# Patient Record
Sex: FEMALE | Race: BLACK OR AFRICAN AMERICAN | NOT HISPANIC OR LATINO | Employment: OTHER | URBAN - METROPOLITAN AREA
[De-identification: names, ages, dates, MRNs, and addresses within clinical notes are randomized per-mention and may not be internally consistent; named-entity substitution may affect disease eponyms.]

---

## 2023-02-09 PROBLEM — G56.01 CARPAL TUNNEL SYNDROME OF RIGHT WRIST: Status: ACTIVE | Noted: 2023-02-09

## 2023-02-09 PROBLEM — K58.1 IRRITABLE BOWEL SYNDROME WITH CONSTIPATION: Status: ACTIVE | Noted: 2023-02-09

## 2023-02-09 PROBLEM — R40.0 DAYTIME SLEEPINESS: Status: ACTIVE | Noted: 2023-02-09

## 2023-02-09 PROBLEM — M79.672 PAIN IN BOTH FEET: Status: ACTIVE | Noted: 2023-02-09

## 2023-02-09 PROBLEM — S90.31XA CONTUSION OF RIGHT FOOT: Status: ACTIVE | Noted: 2023-02-09

## 2023-02-09 PROBLEM — R20.2 TINGLING IN EXTREMITIES: Status: ACTIVE | Noted: 2023-02-09

## 2023-02-09 PROBLEM — M21.42 FLAT FEET, BILATERAL: Status: ACTIVE | Noted: 2023-02-09

## 2023-02-09 PROBLEM — R20.0 NUMBNESS AND TINGLING OF RIGHT HAND: Status: ACTIVE | Noted: 2023-02-09

## 2023-02-09 PROBLEM — I10 BENIGN ESSENTIAL HYPERTENSION: Status: ACTIVE | Noted: 2023-02-09

## 2023-02-09 PROBLEM — S40.861A INSECT BITE OF RIGHT UPPER ARM: Status: ACTIVE | Noted: 2023-02-09

## 2023-02-09 PROBLEM — E04.9 THYROID ENLARGED: Status: ACTIVE | Noted: 2023-02-09
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
## 2023-02-09 PROBLEM — R91.8 LUNG NODULES: Status: ACTIVE | Noted: 2023-02-09

## 2023-02-09 PROBLEM — M21.41 FLAT FEET, BILATERAL: Status: ACTIVE | Noted: 2023-02-09

## 2023-02-09 PROBLEM — M54.12 CHRONIC CERVICAL RADICULOPATHY: Status: ACTIVE | Noted: 2023-02-09

## 2023-02-09 PROBLEM — R06.83 SNORING: Status: ACTIVE | Noted: 2023-02-09

## 2023-02-09 PROBLEM — M79.671 PAIN IN BOTH FEET: Status: ACTIVE | Noted: 2023-02-09

## 2023-02-09 PROBLEM — E78.2 MIXED HYPERLIPIDEMIA: Status: ACTIVE | Noted: 2023-02-09

## 2023-02-09 PROBLEM — K21.9 GASTROESOPHAGEAL REFLUX DISEASE WITHOUT ESOPHAGITIS: Status: ACTIVE | Noted: 2023-02-09

## 2023-02-09 PROBLEM — G47.30 SEVERE SLEEP APNEA: Status: ACTIVE | Noted: 2023-02-09

## 2023-02-09 PROBLEM — R20.2 NUMBNESS AND TINGLING OF RIGHT HAND: Status: ACTIVE | Noted: 2023-02-09

## 2023-02-09 PROBLEM — R20.0 NUMBNESS: Status: ACTIVE | Noted: 2023-02-09

## 2023-02-09 PROBLEM — R73.09 ELEVATED GLUCOSE: Status: ACTIVE | Noted: 2023-02-09

## 2023-02-09 PROBLEM — J30.9 ALLERGIC RHINITIS: Status: ACTIVE | Noted: 2023-02-09

## 2023-02-09 RX ORDER — FLUTICASONE FUROATE 27.5 UG/1
1 SPRAY, METERED NASAL DAILY
COMMUNITY
Start: 2022-02-07

## 2023-02-09 RX ORDER — LEVOCETIRIZINE DIHYDROCHLORIDE 5 MG/1
5 TABLET, FILM COATED ORAL DAILY
COMMUNITY
Start: 2021-01-13 | End: 2023-10-23 | Stop reason: SDUPTHER

## 2023-02-09 RX ORDER — TRIAMCINOLONE ACETONIDE 1 MG/G
OINTMENT TOPICAL 2 TIMES DAILY
COMMUNITY
Start: 2022-04-23

## 2023-02-09 RX ORDER — BACITRACIN 500 [USP'U]/G
OINTMENT TOPICAL 2 TIMES DAILY
COMMUNITY
Start: 2022-04-23 | End: 2023-10-30

## 2023-02-09 RX ORDER — AZELASTINE 1 MG/ML
2 SPRAY, METERED NASAL 2 TIMES DAILY
COMMUNITY

## 2023-02-09 RX ORDER — OMEPRAZOLE 20 MG/1
20 CAPSULE, DELAYED RELEASE ORAL DAILY
COMMUNITY
Start: 2021-11-29 | End: 2023-08-09 | Stop reason: SDUPTHER

## 2023-02-09 RX ORDER — ATORVASTATIN CALCIUM 10 MG/1
10 TABLET, FILM COATED ORAL DAILY
COMMUNITY
Start: 2021-04-08 | End: 2023-08-09 | Stop reason: SDUPTHER

## 2023-02-09 RX ORDER — AMLODIPINE BESYLATE 10 MG/1
10 TABLET ORAL DAILY
COMMUNITY
Start: 2021-04-10 | End: 2023-04-26 | Stop reason: SDUPTHER

## 2023-03-23 ENCOUNTER — OFFICE VISIT (OUTPATIENT)
Dept: PRIMARY CARE | Facility: CLINIC | Age: 78
End: 2023-03-23
Payer: MEDICARE

## 2023-03-23 VITALS
SYSTOLIC BLOOD PRESSURE: 122 MMHG | HEIGHT: 64 IN | BODY MASS INDEX: 27.9 KG/M2 | HEART RATE: 92 BPM | WEIGHT: 163.4 LBS | TEMPERATURE: 97.1 F | DIASTOLIC BLOOD PRESSURE: 77 MMHG | OXYGEN SATURATION: 98 %

## 2023-03-23 DIAGNOSIS — R73.09 ELEVATED GLUCOSE: Primary | ICD-10-CM

## 2023-03-23 PROCEDURE — 3074F SYST BP LT 130 MM HG: CPT | Performed by: FAMILY MEDICINE

## 2023-03-23 PROCEDURE — 99212 OFFICE O/P EST SF 10 MIN: CPT | Performed by: FAMILY MEDICINE

## 2023-03-23 PROCEDURE — 3078F DIAST BP <80 MM HG: CPT | Performed by: FAMILY MEDICINE

## 2023-03-23 ASSESSMENT — PATIENT HEALTH QUESTIONNAIRE - PHQ9
2. FEELING DOWN, DEPRESSED OR HOPELESS: NOT AT ALL
1. LITTLE INTEREST OR PLEASURE IN DOING THINGS: NOT AT ALL
SUM OF ALL RESPONSES TO PHQ9 QUESTIONS 1 AND 2: 0

## 2023-03-23 ASSESSMENT — PAIN SCALES - GENERAL: PAINLEVEL: 0-NO PAIN

## 2023-03-23 NOTE — PROGRESS NOTES
"Subjective   Patient ID: Kalyani Polk is a 77 y.o. female who presents for Diabetes (Pt. Presents for A1C ).  Not checking glucose.  Tries to watch carbohydrates and sugar.  Walks sometimes.  Should do more.  Last A1C done at a home check was 7.1.  No other concerns today.    Diabetes        Review of Systems    Objective   /77 (BP Location: Right arm, Patient Position: Sitting, BP Cuff Size: Adult)   Pulse 92   Temp 36.2 °C (97.1 °F) (Temporal)   Ht 1.613 m (5' 3.5\")   Wt 74.1 kg (163 lb 6.4 oz)   SpO2 98%   BMI 28.49 kg/m²     Physical Exam  Constitutional:       Appearance: Normal appearance.   Cardiovascular:      Rate and Rhythm: Normal rate and regular rhythm.      Heart sounds: No murmur heard.  Musculoskeletal:      Right lower leg: No edema.      Left lower leg: No edema.   Neurological:      Mental Status: She is alert.           Assessment/Plan   Problem List Items Addressed This Visit          Other    Elevated glucose - Primary     A1C 6.0 today is very good. Continue to watch diet, exercise. Can recheck in 6 months.      "

## 2023-04-26 DIAGNOSIS — I10 BENIGN ESSENTIAL HYPERTENSION: Primary | ICD-10-CM

## 2023-04-26 RX ORDER — AMLODIPINE BESYLATE 10 MG/1
10 TABLET ORAL DAILY
Qty: 90 TABLET | Refills: 1 | Status: SHIPPED | OUTPATIENT
Start: 2023-04-26 | End: 2023-10-05

## 2023-06-05 ENCOUNTER — OFFICE VISIT (OUTPATIENT)
Dept: PRIMARY CARE | Facility: CLINIC | Age: 78
End: 2023-06-05
Payer: MEDICARE

## 2023-06-05 VITALS
HEIGHT: 63 IN | SYSTOLIC BLOOD PRESSURE: 130 MMHG | WEIGHT: 162 LBS | HEART RATE: 85 BPM | DIASTOLIC BLOOD PRESSURE: 62 MMHG | OXYGEN SATURATION: 98 % | BODY MASS INDEX: 28.7 KG/M2 | RESPIRATION RATE: 16 BRPM

## 2023-06-05 DIAGNOSIS — R00.2 PALPITATIONS: Primary | ICD-10-CM

## 2023-06-05 DIAGNOSIS — C80.1 CANCER (MULTI): ICD-10-CM

## 2023-06-05 PROCEDURE — 3075F SYST BP GE 130 - 139MM HG: CPT | Performed by: FAMILY MEDICINE

## 2023-06-05 PROCEDURE — 1159F MED LIST DOCD IN RCRD: CPT | Performed by: FAMILY MEDICINE

## 2023-06-05 PROCEDURE — 93000 ELECTROCARDIOGRAM COMPLETE: CPT | Performed by: FAMILY MEDICINE

## 2023-06-05 PROCEDURE — 3078F DIAST BP <80 MM HG: CPT | Performed by: FAMILY MEDICINE

## 2023-06-05 PROCEDURE — 1036F TOBACCO NON-USER: CPT | Performed by: FAMILY MEDICINE

## 2023-06-05 PROCEDURE — 99213 OFFICE O/P EST LOW 20 MIN: CPT | Performed by: FAMILY MEDICINE

## 2023-06-05 PROCEDURE — 1160F RVW MEDS BY RX/DR IN RCRD: CPT | Performed by: FAMILY MEDICINE

## 2023-06-05 ASSESSMENT — PATIENT HEALTH QUESTIONNAIRE - PHQ9
SUM OF ALL RESPONSES TO PHQ9 QUESTIONS 1 AND 2: 0
1. LITTLE INTEREST OR PLEASURE IN DOING THINGS: NOT AT ALL
2. FEELING DOWN, DEPRESSED OR HOPELESS: NOT AT ALL

## 2023-06-05 ASSESSMENT — PAIN SCALES - GENERAL: PAINLEVEL: 0-NO PAIN

## 2023-06-05 NOTE — PROGRESS NOTES
"Subjective   Patient ID: Kalyani Polk is a 78 y.o. female who presents for Chest Pain (Flutter ,very stressed).  Had a few episodes of fluttery feeling in the left side of her chest, lasted  about 1 second and was gone.  Will go weeks between episodes. Hasn't had any for a while.  No chest pain, but IBS symptoms also flared up with a  lot of GI turmoil.  Daughter with cancer recurrence, has been very emotional and stressful.  She is getting a handle on it, and physical symptoms have abated some.    No SOB.  Gets a little swelling in feet that goes down with elevation.    Recently diagnosed with dry eye at [facility]. Was having left eye pain.  Has drops  to use.    Chest Pain         Review of Systems   Cardiovascular:  Positive for chest pain.       Objective   /62 (BP Location: Left arm, Patient Position: Sitting, BP Cuff Size: Adult)   Pulse 85   Resp 16   Ht 1.6 m (5' 3\")   Wt 73.5 kg (162 lb)   SpO2 98%   BMI 28.70 kg/m²     Physical Exam  Vitals reviewed.   Constitutional:       Appearance: Normal appearance.   Cardiovascular:      Rate and Rhythm: Normal rate and regular rhythm.      Heart sounds: No murmur heard.  Pulmonary:      Effort: Pulmonary effort is normal.      Breath sounds: Normal breath sounds.   Musculoskeletal:      Right lower leg: No edema.      Left lower leg: No edema.   Neurological:      Mental Status: She is alert.           Assessment/Plan   Problem List Items Addressed This Visit          Circulatory    Palpitations - Primary    Relevant Orders    ECG 12 lead (Clinic Performed)       Other    Cancer (CMS/HCC)     Previously treated colon cancer, in remission.             Palpitations, intermittent, without other worrisome symptoms.  EKG is normal.  Mostly likely related to stress.  For now, can monitor.  If increasing, or if getting dizzy, short of breath, chest pain, will evaluate further.  "

## 2023-06-05 NOTE — PATIENT INSTRUCTIONS
Palpitations, intermittent, without other worrisome symptoms.  EKG is normal.  Mostly likely related to stress.  For now, can monitor.  If increasing, or if getting dizzy, short of breath, chest pain, will evaluate further.

## 2023-08-09 DIAGNOSIS — E78.2 MIXED HYPERLIPIDEMIA: Primary | ICD-10-CM

## 2023-08-09 DIAGNOSIS — K21.9 GASTROESOPHAGEAL REFLUX DISEASE WITHOUT ESOPHAGITIS: ICD-10-CM

## 2023-08-09 RX ORDER — OMEPRAZOLE 20 MG/1
20 CAPSULE, DELAYED RELEASE ORAL
Qty: 90 CAPSULE | Refills: 1 | Status: SHIPPED | OUTPATIENT
Start: 2023-08-09 | End: 2024-01-24

## 2023-08-09 RX ORDER — ATORVASTATIN CALCIUM 10 MG/1
10 TABLET, FILM COATED ORAL DAILY
Qty: 90 TABLET | Refills: 1 | Status: SHIPPED | OUTPATIENT
Start: 2023-08-09 | End: 2024-01-24

## 2023-08-24 ENCOUNTER — OFFICE VISIT (OUTPATIENT)
Dept: PRIMARY CARE | Facility: CLINIC | Age: 78
End: 2023-08-24
Payer: MEDICARE

## 2023-08-24 VITALS
BODY MASS INDEX: 29.09 KG/M2 | OXYGEN SATURATION: 96 % | WEIGHT: 164.2 LBS | DIASTOLIC BLOOD PRESSURE: 76 MMHG | SYSTOLIC BLOOD PRESSURE: 112 MMHG | HEART RATE: 90 BPM

## 2023-08-24 DIAGNOSIS — G56.01 CARPAL TUNNEL SYNDROME OF RIGHT WRIST: Primary | ICD-10-CM

## 2023-08-24 PROCEDURE — 3078F DIAST BP <80 MM HG: CPT | Performed by: FAMILY MEDICINE

## 2023-08-24 PROCEDURE — 1036F TOBACCO NON-USER: CPT | Performed by: FAMILY MEDICINE

## 2023-08-24 PROCEDURE — 1159F MED LIST DOCD IN RCRD: CPT | Performed by: FAMILY MEDICINE

## 2023-08-24 PROCEDURE — 1160F RVW MEDS BY RX/DR IN RCRD: CPT | Performed by: FAMILY MEDICINE

## 2023-08-24 PROCEDURE — 99213 OFFICE O/P EST LOW 20 MIN: CPT | Performed by: FAMILY MEDICINE

## 2023-08-24 PROCEDURE — 1126F AMNT PAIN NOTED NONE PRSNT: CPT | Performed by: FAMILY MEDICINE

## 2023-08-24 PROCEDURE — 3074F SYST BP LT 130 MM HG: CPT | Performed by: FAMILY MEDICINE

## 2023-08-24 RX ORDER — METHYLPREDNISOLONE 4 MG/1
TABLET ORAL
Qty: 21 TABLET | Refills: 0 | Status: SHIPPED | OUTPATIENT
Start: 2023-08-24 | End: 2023-10-30

## 2023-08-24 ASSESSMENT — PAIN SCALES - GENERAL: PAINLEVEL: 0-NO PAIN

## 2023-08-24 NOTE — PATIENT INSTRUCTIONS
Right carpal tunnel syndrome, with increased symptoms.  Splinting is not helping.  Will try medrol dose pack to reduce inflammation, and see if that helps reduce the symptoms.  If not, or if it recurs, discussed doing an EMG/NCS, a nerve study to verify carpal tunnel syndrome.  Depending on the results of that test, it may be worth following up with the orthopedist for an injection vs surgery, to correct the problem.

## 2023-10-04 DIAGNOSIS — I10 BENIGN ESSENTIAL HYPERTENSION: ICD-10-CM

## 2023-10-05 RX ORDER — AMLODIPINE BESYLATE 10 MG/1
10 TABLET ORAL DAILY
Qty: 90 TABLET | Refills: 3 | Status: SHIPPED | OUTPATIENT
Start: 2023-10-05

## 2023-10-18 ENCOUNTER — OFFICE VISIT (OUTPATIENT)
Dept: PRIMARY CARE | Facility: CLINIC | Age: 78
End: 2023-10-18
Payer: MEDICARE

## 2023-10-18 VITALS
SYSTOLIC BLOOD PRESSURE: 112 MMHG | BODY MASS INDEX: 27.52 KG/M2 | WEIGHT: 161.2 LBS | HEART RATE: 78 BPM | TEMPERATURE: 97.5 F | OXYGEN SATURATION: 98 % | HEIGHT: 64 IN | DIASTOLIC BLOOD PRESSURE: 78 MMHG

## 2023-10-18 DIAGNOSIS — J01.00 ACUTE MAXILLARY SINUSITIS, RECURRENCE NOT SPECIFIED: Primary | ICD-10-CM

## 2023-10-18 PROCEDURE — 3078F DIAST BP <80 MM HG: CPT | Performed by: NURSE PRACTITIONER

## 2023-10-18 PROCEDURE — 1036F TOBACCO NON-USER: CPT | Performed by: NURSE PRACTITIONER

## 2023-10-18 PROCEDURE — 1160F RVW MEDS BY RX/DR IN RCRD: CPT | Performed by: NURSE PRACTITIONER

## 2023-10-18 PROCEDURE — 3074F SYST BP LT 130 MM HG: CPT | Performed by: NURSE PRACTITIONER

## 2023-10-18 PROCEDURE — 1159F MED LIST DOCD IN RCRD: CPT | Performed by: NURSE PRACTITIONER

## 2023-10-18 PROCEDURE — 1126F AMNT PAIN NOTED NONE PRSNT: CPT | Performed by: NURSE PRACTITIONER

## 2023-10-18 PROCEDURE — 99213 OFFICE O/P EST LOW 20 MIN: CPT | Performed by: NURSE PRACTITIONER

## 2023-10-18 RX ORDER — AZITHROMYCIN 250 MG/1
TABLET, FILM COATED ORAL
Qty: 6 TABLET | Refills: 0 | Status: SHIPPED | OUTPATIENT
Start: 2023-10-18 | End: 2023-10-30

## 2023-10-18 ASSESSMENT — PATIENT HEALTH QUESTIONNAIRE - PHQ9
1. LITTLE INTEREST OR PLEASURE IN DOING THINGS: NOT AT ALL
2. FEELING DOWN, DEPRESSED OR HOPELESS: NOT AT ALL
SUM OF ALL RESPONSES TO PHQ9 QUESTIONS 1 AND 2: 0

## 2023-10-18 ASSESSMENT — ENCOUNTER SYMPTOMS: COUGH: 1

## 2023-10-18 ASSESSMENT — PAIN SCALES - GENERAL: PAINLEVEL: 0-NO PAIN

## 2023-10-18 NOTE — PATIENT INSTRUCTIONS
Zpak as prescribed.  Increase fluids and rest.  Can continue to use Coricidin as needed for cold symptoms.  Continue allergy medications as previously taken.  Follow up if symptoms are not improving with treatment.

## 2023-10-18 NOTE — PROGRESS NOTES
"Subjective   Patient ID: Kalyani Polk is a 78 y.o. female who presents for Cough and Nasal Congestion.    Cold symptoms x one week or so.  Has been using OTCs - not helping.   Has ongoing allergies, not currently taking anything for allergies now.  Dripping nasal congestion  +cough overnight  Coricidin used, did help to clear out congestion.    Cough         Review of Systems   Respiratory:  Positive for cough.        Objective   /78 (BP Location: Right arm, Patient Position: Sitting, BP Cuff Size: Large adult)   Pulse 78   Temp 36.4 °C (97.5 °F) (Oral)   Ht 1.613 m (5' 3.5\")   Wt 73.1 kg (161 lb 3.2 oz)   SpO2 98%   BMI 28.11 kg/m²     Physical Exam  Vitals and nursing note reviewed.   Constitutional:       General: She is in acute distress (uncomfortable).      Appearance: Normal appearance. She is ill-appearing. She is not toxic-appearing.   HENT:      Head: Normocephalic.      Right Ear: Ear canal and external ear normal. A middle ear effusion is present.      Left Ear: Ear canal and external ear normal. A middle ear effusion is present.      Nose: Congestion and rhinorrhea present.      Mouth/Throat:      Mouth: Mucous membranes are moist.      Pharynx: Oropharynx is clear. Posterior oropharyngeal erythema present.   Eyes:      Conjunctiva/sclera: Conjunctivae normal.   Cardiovascular:      Rate and Rhythm: Normal rate and regular rhythm.      Heart sounds: Normal heart sounds.   Pulmonary:      Effort: Pulmonary effort is normal. No respiratory distress.      Breath sounds: Normal breath sounds.   Lymphadenopathy:      Cervical: No cervical adenopathy.         Assessment/Plan   Diagnoses and all orders for this visit:  Acute maxillary sinusitis, recurrence not specified  -     azithromycin (Zithromax) 250 mg tablet; Take 2 tablets (500 mg) by mouth once daily for 1 day, THEN 1 tablet (250 mg) once daily for 4 days.         "

## 2023-10-23 DIAGNOSIS — J30.89 ALLERGIC RHINITIS DUE TO OTHER ALLERGIC TRIGGER, UNSPECIFIED SEASONALITY: Primary | ICD-10-CM

## 2023-10-23 RX ORDER — LEVOCETIRIZINE DIHYDROCHLORIDE 5 MG/1
5 TABLET, FILM COATED ORAL EVERY EVENING
Qty: 90 TABLET | Refills: 0 | Status: SHIPPED | OUTPATIENT
Start: 2023-10-23 | End: 2024-01-17

## 2023-10-30 ENCOUNTER — OFFICE VISIT (OUTPATIENT)
Dept: PRIMARY CARE | Facility: CLINIC | Age: 78
End: 2023-10-30
Payer: MEDICARE

## 2023-10-30 VITALS
OXYGEN SATURATION: 100 % | DIASTOLIC BLOOD PRESSURE: 64 MMHG | BODY MASS INDEX: 28.48 KG/M2 | TEMPERATURE: 97.5 F | SYSTOLIC BLOOD PRESSURE: 112 MMHG | HEIGHT: 64 IN | WEIGHT: 166.8 LBS | HEART RATE: 97 BPM

## 2023-10-30 DIAGNOSIS — G56.01 CARPAL TUNNEL SYNDROME OF RIGHT WRIST: ICD-10-CM

## 2023-10-30 DIAGNOSIS — R20.2 TINGLING IN EXTREMITIES: ICD-10-CM

## 2023-10-30 DIAGNOSIS — M67.431 GANGLION CYST OF WRIST, RIGHT: Primary | ICD-10-CM

## 2023-10-30 PROCEDURE — 3078F DIAST BP <80 MM HG: CPT | Performed by: NURSE PRACTITIONER

## 2023-10-30 PROCEDURE — 1036F TOBACCO NON-USER: CPT | Performed by: NURSE PRACTITIONER

## 2023-10-30 PROCEDURE — 99213 OFFICE O/P EST LOW 20 MIN: CPT | Performed by: NURSE PRACTITIONER

## 2023-10-30 PROCEDURE — 1160F RVW MEDS BY RX/DR IN RCRD: CPT | Performed by: NURSE PRACTITIONER

## 2023-10-30 PROCEDURE — 1125F AMNT PAIN NOTED PAIN PRSNT: CPT | Performed by: NURSE PRACTITIONER

## 2023-10-30 PROCEDURE — 3074F SYST BP LT 130 MM HG: CPT | Performed by: NURSE PRACTITIONER

## 2023-10-30 PROCEDURE — 1159F MED LIST DOCD IN RCRD: CPT | Performed by: NURSE PRACTITIONER

## 2023-10-30 RX ORDER — METHYLPREDNISOLONE 4 MG/1
TABLET ORAL
Qty: 21 TABLET | Refills: 0 | Status: SHIPPED | OUTPATIENT
Start: 2023-10-30 | End: 2023-12-21 | Stop reason: WASHOUT

## 2023-10-30 ASSESSMENT — PATIENT HEALTH QUESTIONNAIRE - PHQ9
1. LITTLE INTEREST OR PLEASURE IN DOING THINGS: NOT AT ALL
SUM OF ALL RESPONSES TO PHQ9 QUESTIONS 1 AND 2: 0
2. FEELING DOWN, DEPRESSED OR HOPELESS: NOT AT ALL

## 2023-10-30 ASSESSMENT — PAIN SCALES - GENERAL: PAINLEVEL: 8

## 2023-10-30 NOTE — PROGRESS NOTES
"Subjective   Patient ID: Kalyani Polk is a 78 y.o. female who presents for Arm Pain and Peripheral Nerve Injury.    Presents for follow up for right arm nerve pain - tingling.  Constant, mainly along right wrist with radiating pain into fingers 2-5.  Last night was in right upper arm. Denies any neck pain or recent injuries.  Has tried wearing brace for wrist, makes pain worse.  Has seen Orthopedics in the past. Does not want to have surgery.  Last visit with Dr. Hernandez, told if persisting, would need EMG.   Reports history of ganglion cyst in same area many years ago. Was drained but never surgically removed.    Arm Pain          Review of Systems    Objective   /64 (BP Location: Left arm, Patient Position: Sitting, BP Cuff Size: Small adult)   Pulse 97   Temp 36.4 °C (97.5 °F) (Oral)   Ht 1.613 m (5' 3.5\")   Wt 75.7 kg (166 lb 12.8 oz)   SpO2 100%   BMI 29.08 kg/m²     Physical Exam  Vitals and nursing note reviewed.   Constitutional:       General: She is not in acute distress.     Appearance: Normal appearance.   HENT:      Right Ear: Ear canal and external ear normal. A middle ear effusion is present.      Left Ear: Ear canal and external ear normal. A middle ear effusion is present.      Nose: Nose normal.      Mouth/Throat:      Mouth: Mucous membranes are moist.      Pharynx: Oropharynx is clear.   Cardiovascular:      Pulses:           Radial pulses are 2+ on the right side and 2+ on the left side.   Musculoskeletal:      Right wrist: Swelling and tenderness (radial ganglion cyst) present. Decreased range of motion. Normal pulse.      Right hand: Decreased strength.      Left hand: Normal.      Comments: Negative Tinel's  Unable to perform Phalen's due to arthritis changes in wrist   Lymphadenopathy:      Cervical: No cervical adenopathy.   Skin:     Capillary Refill: Capillary refill takes less than 2 seconds.         Assessment/Plan   Diagnoses and all orders for this visit:  Ganglion cyst of " wrist, right  -     Referral to Orthopaedic Surgery; Future  Tingling in extremities  Carpal tunnel syndrome of right wrist  -     methylPREDNISolone (Medrol Dospak) 4 mg tablets; Take as directed on package.

## 2023-10-30 NOTE — PATIENT INSTRUCTIONS
Medrol (steroid) as prescribed for right wrist inflammation and sinus swelling/allergies.  Refer back to Orthopedic hand for follow up options for ganglion cyst, wrist arthritis.  Call to schedule

## 2023-11-01 ENCOUNTER — TELEPHONE (OUTPATIENT)
Dept: PRIMARY CARE | Facility: CLINIC | Age: 78
End: 2023-11-01
Payer: MEDICARE

## 2023-11-01 DIAGNOSIS — G56.01 CARPAL TUNNEL SYNDROME OF RIGHT WRIST: ICD-10-CM

## 2023-11-01 DIAGNOSIS — M67.431 GANGLION CYST OF WRIST, RIGHT: Primary | ICD-10-CM

## 2023-11-01 DIAGNOSIS — R20.2 TINGLING IN EXTREMITIES: ICD-10-CM

## 2023-11-01 RX ORDER — GABAPENTIN 100 MG/1
100 CAPSULE ORAL 2 TIMES DAILY
Qty: 60 CAPSULE | Refills: 0 | Status: SHIPPED | OUTPATIENT
Start: 2023-11-01 | End: 2023-12-01

## 2023-11-13 ENCOUNTER — OFFICE VISIT (OUTPATIENT)
Dept: PRIMARY CARE | Facility: CLINIC | Age: 78
End: 2023-11-13
Payer: MEDICARE

## 2023-11-13 VITALS
DIASTOLIC BLOOD PRESSURE: 76 MMHG | BODY MASS INDEX: 28.54 KG/M2 | SYSTOLIC BLOOD PRESSURE: 144 MMHG | WEIGHT: 167.2 LBS | OXYGEN SATURATION: 96 % | TEMPERATURE: 97.1 F | HEART RATE: 112 BPM | HEIGHT: 64 IN

## 2023-11-13 DIAGNOSIS — Z23 ENCOUNTER FOR IMMUNIZATION: Primary | ICD-10-CM

## 2023-11-13 DIAGNOSIS — R20.2 NUMBNESS AND TINGLING OF RIGHT HAND: ICD-10-CM

## 2023-11-13 DIAGNOSIS — R20.0 NUMBNESS AND TINGLING OF RIGHT HAND: ICD-10-CM

## 2023-11-13 PROCEDURE — G0008 ADMIN INFLUENZA VIRUS VAC: HCPCS | Performed by: FAMILY MEDICINE

## 2023-11-13 PROCEDURE — 1126F AMNT PAIN NOTED NONE PRSNT: CPT | Performed by: FAMILY MEDICINE

## 2023-11-13 PROCEDURE — 99212 OFFICE O/P EST SF 10 MIN: CPT | Performed by: FAMILY MEDICINE

## 2023-11-13 PROCEDURE — 90662 IIV NO PRSV INCREASED AG IM: CPT | Performed by: FAMILY MEDICINE

## 2023-11-13 PROCEDURE — 1036F TOBACCO NON-USER: CPT | Performed by: FAMILY MEDICINE

## 2023-11-13 PROCEDURE — 1160F RVW MEDS BY RX/DR IN RCRD: CPT | Performed by: FAMILY MEDICINE

## 2023-11-13 PROCEDURE — 3078F DIAST BP <80 MM HG: CPT | Performed by: FAMILY MEDICINE

## 2023-11-13 PROCEDURE — 1159F MED LIST DOCD IN RCRD: CPT | Performed by: FAMILY MEDICINE

## 2023-11-13 PROCEDURE — 3077F SYST BP >= 140 MM HG: CPT | Performed by: FAMILY MEDICINE

## 2023-11-13 ASSESSMENT — PAIN SCALES - GENERAL: PAINLEVEL: 0-NO PAIN

## 2023-11-13 NOTE — PROGRESS NOTES
"Subjective   Patient ID: Kalyani Polk is a 78 y.o. female who presents for Hand Pain (Right hand pain /Tingling, burning and numbness ) and Ganglion Cyst.  Complains of ongoing/worsening right hand and wrist pain with tingling.  Will see Dr. Marcos Diaz tomorrow.  Increased tingling that never stops.  Now is into the thumb and palm.  Interferes with sleep.  Had medrol dose pack, and more recently, given gabapentin.  Gabapentin made dizzy.  Only  took  3 pills.  Helped pain, not tingling.  Maybe feels week as well.  Wrist splint made feel worse, maybe too small.            Review of Systems    Objective   /76 (BP Location: Left arm, Patient Position: Sitting, BP Cuff Size: Adult)   Pulse (!) 112   Temp 36.2 °C (97.1 °F) (Temporal)   Ht 1.613 m (5' 3.5\")   Wt 75.8 kg (167 lb 3.2 oz)   SpO2 96%   BMI 29.15 kg/m²     Physical Exam  Vitals reviewed.   Constitutional:       Appearance: Normal appearance.   Musculoskeletal:      Comments: Right wrist with decreased   strength.  Hyperesthesia of palm and thumb.   Neurological:      Mental Status: She is alert.           Assessment/Plan   Problem List Items Addressed This Visit       Numbness and tingling of right hand     Other Visit Diagnoses       Encounter for immunization    -  Primary    Relevant Orders    Flu vaccine, quadrivalent, high-dose, preservative free, age 65y+ (FLUZONE) (Completed)               "

## 2023-11-14 ENCOUNTER — OFFICE VISIT (OUTPATIENT)
Dept: ORTHOPEDIC SURGERY | Facility: CLINIC | Age: 78
End: 2023-11-14
Payer: MEDICARE

## 2023-11-14 VITALS — HEIGHT: 63 IN | BODY MASS INDEX: 29.59 KG/M2 | WEIGHT: 167 LBS

## 2023-11-14 DIAGNOSIS — G56.01 CARPAL TUNNEL SYNDROME ON RIGHT: Primary | ICD-10-CM

## 2023-11-14 DIAGNOSIS — M67.431 GANGLION CYST OF WRIST, RIGHT: ICD-10-CM

## 2023-11-14 PROCEDURE — 1036F TOBACCO NON-USER: CPT | Performed by: ORTHOPAEDIC SURGERY

## 2023-11-14 PROCEDURE — 1160F RVW MEDS BY RX/DR IN RCRD: CPT | Performed by: ORTHOPAEDIC SURGERY

## 2023-11-14 PROCEDURE — 99214 OFFICE O/P EST MOD 30 MIN: CPT | Performed by: ORTHOPAEDIC SURGERY

## 2023-11-14 PROCEDURE — 1159F MED LIST DOCD IN RCRD: CPT | Performed by: ORTHOPAEDIC SURGERY

## 2023-11-14 PROCEDURE — 20526 THER INJECTION CARP TUNNEL: CPT | Performed by: ORTHOPAEDIC SURGERY

## 2023-11-14 PROCEDURE — 3078F DIAST BP <80 MM HG: CPT | Performed by: ORTHOPAEDIC SURGERY

## 2023-11-14 PROCEDURE — 3077F SYST BP >= 140 MM HG: CPT | Performed by: ORTHOPAEDIC SURGERY

## 2023-11-14 PROCEDURE — 1126F AMNT PAIN NOTED NONE PRSNT: CPT | Performed by: ORTHOPAEDIC SURGERY

## 2023-11-14 RX ORDER — TRIAMCINOLONE ACETONIDE 40 MG/ML
20 INJECTION, SUSPENSION INTRA-ARTICULAR; INTRAMUSCULAR
Status: COMPLETED | OUTPATIENT
Start: 2023-11-14 | End: 2023-11-14

## 2023-11-14 RX ADMIN — TRIAMCINOLONE ACETONIDE 20 MG: 40 INJECTION, SUSPENSION INTRA-ARTICULAR; INTRAMUSCULAR at 10:49

## 2023-11-14 NOTE — LETTER
November 14, 2023     Marilyn Hernadnez MD  [address]  [address]  [facility] [address]    Patient: Kalyani Polk   YOB: 1945   Date of Visit: 11/14/2023       Dear Dr. Marilyn Hernandez MD:    Thank you for referring Kalyani Polk to me for evaluation. Below are my notes for this consultation.  If you have questions, please do not hesitate to call me. I look forward to following your patient along with you.       Sincerely,     Marcos Diaz MD      CC: No Recipients  ______________________________________________________________________________________    78-year-old retired right-hand-dominant female with prediabetes presents today for evaluation of right hand pain numbness and tingling.  She has frequent sleep disturbances.  Symptoms have been present for many many months.  She was diagnosed with carpal tunnel syndrome by Dr. Gonzlaez in February.  At that time recommendations were for conservative management with nighttime splinting.  She indicates that she has tried splinting but did not feel that her splint was comfortable and so she does not wear it.  More recently her primary care physician has given her prednisone and gabapentin.  She does not tolerate the side effects from the gabapentin and does not feel that this is helped her symptoms either.    Past medical history, medications, allergies, surgical history and review of systems have been reviewed with the patient. Pertinent changes are documented in the HPI. Otherwise they are unchanged when compared to last visit on February 14, 2023 with Dr. Gonzalez.    Physical Examination Findings:  Constitutional: Appears well-developed and well-nourished.  Head: Normocephalic and atraumatic.  Eyes: Pupils are equal and round.  Cardiovascular: Intact distal pulses.   Respiratory: Effort normal. No respiratory distress.  Neurologic: Alert and oriented to person, place, and time.  Skin: Skin is warm and dry.  Hematologic / Lymphatic: No lymphedema,  lymphangitis.  Psychiatric: normal mood and affect. Behavior is normal.   Musculoskeletal: Right wrist examination reveals minimal degenerative changes.  She has what appears to be a small asymptomatic ganglion cyst on the volar aspect of the distal radius.  No thenar atrophy.  Full finger and thumb flexion.  No triggering.  Positive Rogerio median nerve compression test.  Positive Tinel's sign.  Diminished sensation median nerve distribution.    Impression: Right carpal tunnel syndrome.    Plan: We have discussed this diagnosis.  We have discussed the benefit of splint utilization at nighttime to help her sleep by avoiding prolonged wrist flexion.  We have discussed the benefit of steroid injections for diagnostic and therapeutic purposes.  We have discussed the role of surgery for this condition.  At this time she wants to try injections.  I do not think the gabapentin is an appropriate medication for this condition and she can discontinue that particular since she does not tolerate the side effects.    Hand / UE Inj/Asp: R carpal tunnel for carpal tunnel syndrome on 11/14/2023 10:49 AM  Indications: pain and therapeutic  Details: 25 G needle, volar approach  Medications: 20 mg triamcinolone acetonide 40 mg/mL  Outcome: tolerated well, no immediate complications  Procedure, treatment alternatives, risks and benefits explained, specific risks discussed. Consent was given by the patient. Immediately prior to procedure a time out was called to verify the correct patient, procedure, equipment, support staff and site/side marked as required. Patient was prepped and draped in the usual sterile fashion.             Return as needed for recurrence or progression of problems .    Marcos Diaz MD    [facility] [employer]  Department of Orthopaedic Surgery  Chief of Hand and Upper Extremity Surgery  [facility]    Dictation performed with  the use of voice recognition software. Syntax and grammatical errors may exist.

## 2023-11-14 NOTE — PATIENT INSTRUCTIONS
Worsening right  hand  and wrist tingling and  pain.  Will be seeing Dr. Diaz tomorrow.  In meantime, take 1 gabapentin tonight  to help with symptoms.    Thyroid with nodules.  Repeat ultrasound  is due around Jan 20, 2024.

## 2023-11-14 NOTE — PROGRESS NOTES
78-year-old retired right-hand-dominant female with prediabetes presents today for evaluation of right hand pain numbness and tingling.  She has frequent sleep disturbances.  Symptoms have been present for many many months.  She was diagnosed with carpal tunnel syndrome by Dr. Gonzalez in February.  At that time recommendations were for conservative management with nighttime splinting.  She indicates that she has tried splinting but did not feel that her splint was comfortable and so she does not wear it.  More recently her primary care physician has given her prednisone and gabapentin.  She does not tolerate the side effects from the gabapentin and does not feel that this is helped her symptoms either.    Past medical history, medications, allergies, surgical history and review of systems have been reviewed with the patient. Pertinent changes are documented in the HPI. Otherwise they are unchanged when compared to last visit on February 14, 2023 with Dr. Gonzalez.    Physical Examination Findings:  Constitutional: Appears well-developed and well-nourished.  Head: Normocephalic and atraumatic.  Eyes: Pupils are equal and round.  Cardiovascular: Intact distal pulses.   Respiratory: Effort normal. No respiratory distress.  Neurologic: Alert and oriented to person, place, and time.  Skin: Skin is warm and dry.  Hematologic / Lymphatic: No lymphedema, lymphangitis.  Psychiatric: normal mood and affect. Behavior is normal.   Musculoskeletal: Right wrist examination reveals minimal degenerative changes.  She has what appears to be a small asymptomatic ganglion cyst on the volar aspect of the distal radius.  No thenar atrophy.  Full finger and thumb flexion.  No triggering.  Positive Rogerio median nerve compression test.  Positive Tinel's sign.  Diminished sensation median nerve distribution.    Impression: Right carpal tunnel syndrome.    Plan: We have discussed this diagnosis.  We have discussed the benefit of splint  utilization at nighttime to help her sleep by avoiding prolonged wrist flexion.  We have discussed the benefit of steroid injections for diagnostic and therapeutic purposes.  We have discussed the role of surgery for this condition.  At this time she wants to try injections.  I do not think the gabapentin is an appropriate medication for this condition and she can discontinue that particular since she does not tolerate the side effects.    Hand / UE Inj/Asp: R carpal tunnel for carpal tunnel syndrome on 11/14/2023 10:49 AM  Indications: pain and therapeutic  Details: 25 G needle, volar approach  Medications: 20 mg triamcinolone acetonide 40 mg/mL  Outcome: tolerated well, no immediate complications  Procedure, treatment alternatives, risks and benefits explained, specific risks discussed. Consent was given by the patient. Immediately prior to procedure a time out was called to verify the correct patient, procedure, equipment, support staff and site/side marked as required. Patient was prepped and draped in the usual sterile fashion.             Return as needed for recurrence or progression of problems .    Marcos Diaz MD    [facility] [employer]  Department of Orthopaedic Surgery  Chief of Hand and Upper Extremity Surgery  [facility]    Dictation performed with the use of voice recognition software. Syntax and grammatical errors may exist.

## 2023-12-02 ENCOUNTER — APPOINTMENT (OUTPATIENT)
Dept: RADIOLOGY | Facility: HOSPITAL | Age: 78
End: 2023-12-02
Payer: MEDICARE

## 2023-12-02 ENCOUNTER — HOSPITAL ENCOUNTER (EMERGENCY)
Facility: HOSPITAL | Age: 78
Discharge: HOME | End: 2023-12-02
Attending: INTERNAL MEDICINE
Payer: MEDICARE

## 2023-12-02 VITALS
DIASTOLIC BLOOD PRESSURE: 87 MMHG | BODY MASS INDEX: 27.66 KG/M2 | RESPIRATION RATE: 18 BRPM | OXYGEN SATURATION: 98 % | WEIGHT: 162 LBS | HEIGHT: 64 IN | TEMPERATURE: 98.1 F | SYSTOLIC BLOOD PRESSURE: 138 MMHG | HEART RATE: 92 BPM

## 2023-12-02 DIAGNOSIS — R51.9 NONINTRACTABLE HEADACHE, UNSPECIFIED CHRONICITY PATTERN, UNSPECIFIED HEADACHE TYPE: Primary | ICD-10-CM

## 2023-12-02 LAB
ALBUMIN SERPL BCP-MCNC: 4 G/DL (ref 3.4–5)
ALP SERPL-CCNC: 43 U/L (ref 33–136)
ALT SERPL W P-5'-P-CCNC: 13 U/L (ref 7–45)
ANION GAP SERPL CALC-SCNC: 16 MMOL/L (ref 10–20)
APPEARANCE UR: CLEAR
AST SERPL W P-5'-P-CCNC: 16 U/L (ref 9–39)
BASOPHILS # BLD AUTO: 0.04 X10*3/UL (ref 0–0.1)
BASOPHILS NFR BLD AUTO: 0.7 %
BILIRUB SERPL-MCNC: 0.6 MG/DL (ref 0–1.2)
BILIRUB UR STRIP.AUTO-MCNC: NEGATIVE MG/DL
BUN SERPL-MCNC: 12 MG/DL (ref 6–23)
CALCIUM SERPL-MCNC: 8.3 MG/DL (ref 8.6–10.3)
CHLORIDE SERPL-SCNC: 103 MMOL/L (ref 98–107)
CO2 SERPL-SCNC: 21 MMOL/L (ref 21–32)
COLOR UR: YELLOW
CREAT SERPL-MCNC: 0.9 MG/DL (ref 0.5–1.05)
EOSINOPHIL # BLD AUTO: 0.05 X10*3/UL (ref 0–0.4)
EOSINOPHIL NFR BLD AUTO: 0.9 %
ERYTHROCYTE [DISTWIDTH] IN BLOOD BY AUTOMATED COUNT: 14.6 % (ref 11.5–14.5)
ERYTHROCYTE [SEDIMENTATION RATE] IN BLOOD BY WESTERGREN METHOD: 58 MM/H (ref 0–30)
FLUAV RNA RESP QL NAA+PROBE: NOT DETECTED
FLUBV RNA RESP QL NAA+PROBE: NOT DETECTED
GFR SERPL CREATININE-BSD FRML MDRD: 65 ML/MIN/1.73M*2
GLUCOSE SERPL-MCNC: 106 MG/DL (ref 74–99)
GLUCOSE UR STRIP.AUTO-MCNC: NEGATIVE MG/DL
HCT VFR BLD AUTO: 40 % (ref 36–46)
HGB BLD-MCNC: 13.7 G/DL (ref 12–16)
HOLD SPECIMEN: NORMAL
IMM GRANULOCYTES # BLD AUTO: 0.04 X10*3/UL (ref 0–0.5)
IMM GRANULOCYTES NFR BLD AUTO: 0.7 % (ref 0–0.9)
KETONES UR STRIP.AUTO-MCNC: NEGATIVE MG/DL
LEUKOCYTE ESTERASE UR QL STRIP.AUTO: NEGATIVE
LYMPHOCYTES # BLD AUTO: 1.12 X10*3/UL (ref 0.8–3)
LYMPHOCYTES NFR BLD AUTO: 20.9 %
MCH RBC QN AUTO: 27.8 PG (ref 26–34)
MCHC RBC AUTO-ENTMCNC: 34.3 G/DL (ref 32–36)
MCV RBC AUTO: 81 FL (ref 80–100)
MONOCYTES # BLD AUTO: 0.8 X10*3/UL (ref 0.05–0.8)
MONOCYTES NFR BLD AUTO: 14.9 %
NEUTROPHILS # BLD AUTO: 3.32 X10*3/UL (ref 1.6–5.5)
NEUTROPHILS NFR BLD AUTO: 61.9 %
NITRITE UR QL STRIP.AUTO: NEGATIVE
NRBC BLD-RTO: 0 /100 WBCS (ref 0–0)
PH UR STRIP.AUTO: 7 [PH]
PLATELET # BLD AUTO: 254 X10*3/UL (ref 150–450)
POTASSIUM SERPL-SCNC: 3.5 MMOL/L (ref 3.5–5.3)
PROT SERPL-MCNC: 6.9 G/DL (ref 6.4–8.2)
PROT UR STRIP.AUTO-MCNC: NEGATIVE MG/DL
RBC # BLD AUTO: 4.92 X10*6/UL (ref 4–5.2)
RBC # UR STRIP.AUTO: NEGATIVE /UL
SARS-COV-2 RNA RESP QL NAA+PROBE: NOT DETECTED
SODIUM SERPL-SCNC: 136 MMOL/L (ref 136–145)
SP GR UR STRIP.AUTO: 1.01
UROBILINOGEN UR STRIP.AUTO-MCNC: NORMAL MG/DL
WBC # BLD AUTO: 5.4 X10*3/UL (ref 4.4–11.3)

## 2023-12-02 PROCEDURE — 70450 CT HEAD/BRAIN W/O DYE: CPT | Performed by: RADIOLOGY

## 2023-12-02 PROCEDURE — 36415 COLL VENOUS BLD VENIPUNCTURE: CPT

## 2023-12-02 PROCEDURE — 85652 RBC SED RATE AUTOMATED: CPT

## 2023-12-02 PROCEDURE — 87636 SARSCOV2 & INF A&B AMP PRB: CPT

## 2023-12-02 PROCEDURE — 99284 EMERGENCY DEPT VISIT MOD MDM: CPT | Mod: 25 | Performed by: INTERNAL MEDICINE

## 2023-12-02 PROCEDURE — 81003 URINALYSIS AUTO W/O SCOPE: CPT

## 2023-12-02 PROCEDURE — 85025 COMPLETE CBC W/AUTO DIFF WBC: CPT

## 2023-12-02 PROCEDURE — 70450 CT HEAD/BRAIN W/O DYE: CPT

## 2023-12-02 PROCEDURE — 84075 ASSAY ALKALINE PHOSPHATASE: CPT

## 2023-12-02 ASSESSMENT — PAIN SCALES - GENERAL
PAINLEVEL_OUTOF10: 0 - NO PAIN

## 2023-12-02 ASSESSMENT — COLUMBIA-SUICIDE SEVERITY RATING SCALE - C-SSRS
6. HAVE YOU EVER DONE ANYTHING, STARTED TO DO ANYTHING, OR PREPARED TO DO ANYTHING TO END YOUR LIFE?: NO
1. IN THE PAST MONTH, HAVE YOU WISHED YOU WERE DEAD OR WISHED YOU COULD GO TO SLEEP AND NOT WAKE UP?: NO
2. HAVE YOU ACTUALLY HAD ANY THOUGHTS OF KILLING YOURSELF?: NO

## 2023-12-02 ASSESSMENT — PAIN - FUNCTIONAL ASSESSMENT: PAIN_FUNCTIONAL_ASSESSMENT: 0-10

## 2023-12-02 ASSESSMENT — VISUAL ACUITY
OD: 20/50
OS: 20/50
OU: 20/40

## 2023-12-02 NOTE — ED PROVIDER NOTES
HPI   Chief Complaint   Patient presents with    Headache       HPI  HISTORY OF PRESENT ILLNESS:  78 y.o. female presenting to the ED with complaint of a headache that began last night.  Patient reports an intermittent left-sided headache that woke her up several times during the night last night.  She describes it as a jabbing feeling on the left side of the head.  She states that the pain has resolved prior to arrival in the ED.  She states that she has never had a similar headache in the past and has no history of chronic headaches or migraines, which made her nervous, so she decided to come to the ED.  She does endorse some nasal congestion and rhinorrhea over the past 3 to 4 days, as well as some right ear fullness, but denies any other symptoms or complaints.  She has no blurry vision, no vision loss or changes.  No pain with eye movements.  Denies facial pain or pressure.  Is not the worst headache of her life, was not thunderclap in onset.  Has a history of right hand carpal tunnel and has had some tingling in the right digits for multiple months, this is unchanged and at baseline currently.  She denies any other extremity numbness, tingling, or weakness.  Denies gait instability.  Denies difficulty with speech, swallowing, or ambulation.  Denies neck pain or stiffness.  Denies confusion or amnesia.  She has no cough, no shortness of breath, no chest pain, no pleuritic pain.  No palpitations.  No lower extremity pain or swelling.  No abdominal pain, no nausea or vomiting.  Denies fevers or chills.  No other complaints or symptoms voiced.    PMH: HTN, HLD, allergic rhinitis, carpal tunnel syndrome right wrist, GERD, IBS, LICHA  Family history: noncontributory  Social history: non smoker, no ETOH, no illicit substances    12 point review of systems was performed and is negative unless otherwise specified in HPI.        No data recorded                Patient History   Past Medical History:   Diagnosis Date     Personal history of other malignant neoplasm of large intestine 01/17/2021    History of malignant neoplasm of colon     Past Surgical History:   Procedure Laterality Date    OTHER SURGICAL HISTORY  01/17/2021    Colon surgery    OTHER SURGICAL HISTORY  01/17/2021    Tubal ligation     Family History   Problem Relation Name Age of Onset    Dementia Mother      Osteoporosis Mother      Other (neoplasm of colon) Father      Other (CVA) Sister       Social History     Tobacco Use    Smoking status: Never     Passive exposure: Never    Smokeless tobacco: Never   Substance Use Topics    Alcohol use: Never    Drug use: Never       Physical Exam   ED Triage Vitals [12/02/23 0858]   Temp Heart Rate Resp BP   36.5 °C (97.7 °F) 108 16 152/83      SpO2 Temp Source Heart Rate Source Patient Position   97 % Tympanic -- --      BP Location FiO2 (%)     -- --       Physical Exam  Constitutional:       General: She is not in acute distress.     Appearance: She is not toxic-appearing.   HENT:      Head: Normocephalic and atraumatic.      Nose: No congestion.      Mouth/Throat:      Mouth: Mucous membranes are moist.   Eyes:      General: No visual field deficit or scleral icterus.     Extraocular Movements: Extraocular movements intact.      Right eye: Normal extraocular motion and no nystagmus.      Left eye: Normal extraocular motion and no nystagmus.      Pupils: Pupils are equal, round, and reactive to light. Pupils are equal.      Right eye: Pupil is round and reactive.      Left eye: Pupil is round and reactive.   Neck:      Meningeal: Brudzinski's sign and Kernig's sign absent.   Cardiovascular:      Rate and Rhythm: Regular rhythm. Tachycardia present.      Pulses: Normal pulses.   Pulmonary:      Effort: Pulmonary effort is normal. No respiratory distress.   Abdominal:      General: There is no distension.      Palpations: Abdomen is soft.      Tenderness: There is no abdominal tenderness. There is no guarding.    Musculoskeletal:         General: No swelling or tenderness. Normal range of motion.      Cervical back: Normal range of motion and neck supple. No rigidity.      Right lower leg: No edema.      Left lower leg: No edema.   Lymphadenopathy:      Cervical: No cervical adenopathy.   Skin:     General: Skin is warm and dry.      Capillary Refill: Capillary refill takes less than 2 seconds.   Neurological:      General: No focal deficit present.      Mental Status: She is alert and oriented to person, place, and time.      GCS: GCS eye subscore is 4. GCS verbal subscore is 5. GCS motor subscore is 6.      Cranial Nerves: No cranial nerve deficit, dysarthria or facial asymmetry.      Sensory: No sensory deficit.      Motor: No weakness.      Coordination: Coordination normal.      Gait: Gait normal.   Psychiatric:         Mood and Affect: Mood normal.         Behavior: Behavior normal.         Judgment: Judgment normal.     ED Course & MDM   ED Course as of 12/02/23 1804   Sat Dec 02, 2023   1111 10:37 12 lead EKG interpreted by myself and my ED attending reveals sinus rhythm with a rate of 87 beats per minute.  Normal Axis.  There are no ST elevations or depressions. T wave inversions in V1. No acute ischemic changes identified.  [EH]      ED Course User Index  [EH] Radha Artis PA-C         Diagnoses as of 12/02/23 1804   Nonintractable headache, unspecified chronicity pattern, unspecified headache type       Medical Decision Making  ED course / MDM     Summary:  Patient presented with a headache that began last night.  Left-sided, jabbing, intermittent.  Has no headache in the ED.  Has had nasal congestion and rhinorrhea for a few days, no other signs or symptoms.  Vital signs are stable, mildly tachycardic in triage at 108, improved to 85 on repeat vitals.  Patient is very well-appearing, nontoxic, ambulating unassisted with steady gait.  Normal neurologic exam, no motor or sensory deficits.  No ataxia.  No  nystagmus.  Visual fields and visual acuity intact.  Lungs clear to auscultation, heart regular rhythm.  No peripheral edema.  IV established, labs drawn.  Swabs are negative for influenza and COVID.  Labs are reassuring, no leukocytosis, normal hemoglobin, minor electrolyte abnormalities, normal kidney and liver function.  Mildly elevated ESR 58, but she has no visual changes, no temporal artery tenderness, feel this is not represent a temporal arteritis.  CT of the head shows no acute cortical infarct or intracranial hemorrhage, no mass effect.  Patient did not require any pain meds while in the ED, had no recurrence of her pain.  Patient case discussed with ED attending Dr. Garcia, who also saw and evaluated the patient. Results and differential were discussed in detail with the patient.  She has no neck pain or stiffness, headache was not thunderclap in onset, not the worst of her life, no fevers or chills, doubt SAH or meningitis, patient agrees LP is not indicated.  She has a normal physical exam, symptoms are not suspicious for any acute neurologic pathology.  Appears most consistent with an occipital neuralgia.  Patient is in agreement the plan for discharge with close outpatient PCP follow-up.  She was given referral to neurology as well.  Feels reassured with a normal CT and labs. Patient was given strict return precautions, understands reasons to return to the ED. Also discussed supportive care instructions. I expressed the importance of outpatient follow up with their PCP. All questions were answered, patient expressed understanding and stated that they would comply.    Patient was advised to follow up with PCP or recommended provider in 2-3 days for another evaluation and exam. I advised patient and family/friend/caregiver/guardian to return or go to closest emergency room immediately if symptoms change, get worse, new symptoms develop prior to follow up. If there is no improvement in symptoms in the  next 24 hours they are advised to return for further evaluation and exam. I also explained the plan and treatment course. Patient and family/friend/caregiver/guardian is in agreement with plan, treatment course, and follow up and states verbally that they will comply.    Impression:  1. See diagnosis    Plan: Homegoing. I discussed the differential, results, and discharge plan with the patient and family/friend/caregiver. I emphasized the importance of follow-up with the physician I referred them to in the timeframe recommended.  I explained reasons for the patient to return to the Emergency Department. They agreed that if they feel their condition is worsening or if they have any other concern they should call 911 immediately for further assistance. We also discussed medications that were prescribed including common side effects and interactions. The patient was advised to abstain from driving, operating heavy machinery, or making significant decisions while taking medications such as opiates and muscle relaxers that may impair this. I gave the patient an opportunity to ask all questions they had and answered all of them accordingly. They understand return precautions and discharge instructions. The patient and family/friend/caregiver expressed understanding verbally and that they would comply.       Disposition: Discharge    Patient seen and discussed with Dr. Garcia    This note has been transcribed using voice recognition and may contain grammatical errors, misplaced words, incorrect words, incorrect phrases or other errors.     Procedure  Procedures     Radha Artis PA-C  12/02/23 9648

## 2023-12-02 NOTE — ED TRIAGE NOTES
"C/O LEFT SIDED HEADACHE, DENIES PAIN AT THIS TIME, PT STATES \"THERE WAS A JABBING FEELING ON THE LEFT SIDE OF MY  HEAD THAT WOKE ME UP SEVERAL TIMES LAST NIGHT\" DENIES HX OF MIGRAINES, DENIES VISION CHANGES/NUMBNESS/WEAKNESS/DIZZINESS, C/O RIGHT EAR \"FULLNESS\" CONGESTION  "

## 2023-12-05 ENCOUNTER — APPOINTMENT (OUTPATIENT)
Dept: PRIMARY CARE | Facility: CLINIC | Age: 78
End: 2023-12-05
Payer: MEDICARE

## 2023-12-06 ENCOUNTER — HOSPITAL ENCOUNTER (OUTPATIENT)
Dept: RADIOLOGY | Facility: EXTERNAL LOCATION | Age: 78
Discharge: HOME | End: 2023-12-06

## 2023-12-06 ENCOUNTER — OFFICE VISIT (OUTPATIENT)
Dept: PRIMARY CARE | Facility: CLINIC | Age: 78
End: 2023-12-06
Payer: MEDICARE

## 2023-12-06 VITALS
WEIGHT: 162 LBS | HEART RATE: 90 BPM | TEMPERATURE: 97.8 F | SYSTOLIC BLOOD PRESSURE: 114 MMHG | DIASTOLIC BLOOD PRESSURE: 64 MMHG | OXYGEN SATURATION: 98 % | HEIGHT: 64 IN | BODY MASS INDEX: 27.66 KG/M2

## 2023-12-06 DIAGNOSIS — R51.9 SEVERE HEADACHE: ICD-10-CM

## 2023-12-06 DIAGNOSIS — R51.9 SEVERE HEADACHE: Primary | ICD-10-CM

## 2023-12-06 PROCEDURE — 1160F RVW MEDS BY RX/DR IN RCRD: CPT | Performed by: FAMILY MEDICINE

## 2023-12-06 PROCEDURE — 3078F DIAST BP <80 MM HG: CPT | Performed by: FAMILY MEDICINE

## 2023-12-06 PROCEDURE — 1159F MED LIST DOCD IN RCRD: CPT | Performed by: FAMILY MEDICINE

## 2023-12-06 PROCEDURE — 99213 OFFICE O/P EST LOW 20 MIN: CPT | Performed by: FAMILY MEDICINE

## 2023-12-06 PROCEDURE — 3074F SYST BP LT 130 MM HG: CPT | Performed by: FAMILY MEDICINE

## 2023-12-06 PROCEDURE — 1126F AMNT PAIN NOTED NONE PRSNT: CPT | Performed by: FAMILY MEDICINE

## 2023-12-06 PROCEDURE — 1036F TOBACCO NON-USER: CPT | Performed by: FAMILY MEDICINE

## 2023-12-06 ASSESSMENT — ENCOUNTER SYMPTOMS
DEPRESSION: 0
LOSS OF SENSATION IN FEET: 0
OCCASIONAL FEELINGS OF UNSTEADINESS: 0

## 2023-12-06 ASSESSMENT — PATIENT HEALTH QUESTIONNAIRE - PHQ9
SUM OF ALL RESPONSES TO PHQ9 QUESTIONS 1 AND 2: 0
2. FEELING DOWN, DEPRESSED OR HOPELESS: NOT AT ALL
1. LITTLE INTEREST OR PLEASURE IN DOING THINGS: NOT AT ALL

## 2023-12-06 ASSESSMENT — PAIN SCALES - GENERAL: PAINLEVEL: 0-NO PAIN

## 2023-12-06 NOTE — PATIENT INSTRUCTIONS
Follow up of severe left sided headache.  Will be scheduling with neurologist at [facility].  If unable to get an appointment quickly, will schedule [facility] prior to appointment.  If any worsening or additional symptoms will need  follow sooner.

## 2023-12-06 NOTE — PROGRESS NOTES
"Subjective   Patient ID: Kalyani Polk is a 78 y.o. female who presents for Headache and Hospital Follow-up.  See in ED on 12/2/23.  Summary copied below:  \"Summary:  Patient presented with a headache that began last night.  Left-sided, jabbing, intermittent.  Has no headache in the ED.  Has had nasal congestion and rhinorrhea for a few days, no other signs or symptoms.  Vital signs are stable, mildly tachycardic in triage at 108, improved to 85 on repeat vitals.  Patient is very well-appearing, nontoxic, ambulating unassisted with steady gait.  Normal neurologic exam, no motor or sensory deficits.  No ataxia.  No nystagmus.  Visual fields and visual acuity intact.  Lungs clear to auscultation, heart regular rhythm.  No peripheral edema.  IV established, labs drawn.  Swabs are negative for influenza and COVID.  Labs are reassuring, no leukocytosis, normal hemoglobin, minor electrolyte abnormalities, normal kidney and liver function.  Mildly elevated ESR 58, but she has no visual changes, no temporal artery tenderness, feel this is not represent a temporal arteritis.  CT of the head shows no acute cortical infarct or intracranial hemorrhage, no mass effect.  Patient did not require any pain meds while in the ED, had no recurrence of her pain.  Patient case discussed with ED attending Dr. Garcia, who also saw and evaluated the patient. Results and differential were discussed in detail with the patient.  She has no neck pain or stiffness, headache was not thunderclap in onset, not the worst of her life, no fevers or chills, doubt SAH or meningitis, patient agrees LP is not indicated.  She has a normal physical exam, symptoms are not suspicious for any acute neurologic pathology.  Appears most consistent with an occipital neuralgia.  Patient is in agreement the plan for discharge with close outpatient PCP follow-up.  She was given referral to neurology as well.  Feels reassured with a normal CT and labs. Patient was given " "strict return precautions, understands reasons to return to the ED. Also discussed supportive care instructions. I expressed the importance of outpatient follow up with their PCP. All questions were answered, patient expressed understanding and stated that they would comply.\"        Patient had been woken up with left sided sharp head pains, that were intermittent.  Happened 3-4 times, and took tylenol.  It went away after.  After that had sinus type headache but gone     No further episodes.  Recommended to follow up with neurologist.  Prefers to go to [facility] for specialists.        Review of Systems    Objective   /64 (BP Location: Left arm, Patient Position: Sitting, BP Cuff Size: Large adult)   Pulse 90   Temp 36.6 °C (97.8 °F) (Oral)   Ht 1.619 m (5' 3.75\")   Wt 73.5 kg (162 lb)   SpO2 98%   BMI 28.03 kg/m²     Physical Exam  Vitals reviewed.   Constitutional:       Appearance: Normal appearance.   Neurological:      Mental Status: She is alert.   Psychiatric:         Mood and Affect: Mood normal.         Behavior: Behavior normal.           Assessment/Plan   Problem List Items Addressed This Visit    None         "

## 2023-12-21 ENCOUNTER — OFFICE VISIT (OUTPATIENT)
Dept: PRIMARY CARE | Facility: CLINIC | Age: 78
End: 2023-12-21
Payer: MEDICARE

## 2023-12-21 VITALS
HEART RATE: 102 BPM | DIASTOLIC BLOOD PRESSURE: 72 MMHG | SYSTOLIC BLOOD PRESSURE: 130 MMHG | WEIGHT: 163.8 LBS | BODY MASS INDEX: 27.96 KG/M2 | HEIGHT: 64 IN | OXYGEN SATURATION: 98 %

## 2023-12-21 DIAGNOSIS — J01.00 ACUTE NON-RECURRENT MAXILLARY SINUSITIS: Primary | ICD-10-CM

## 2023-12-21 PROCEDURE — 3078F DIAST BP <80 MM HG: CPT | Performed by: FAMILY MEDICINE

## 2023-12-21 PROCEDURE — 3075F SYST BP GE 130 - 139MM HG: CPT | Performed by: FAMILY MEDICINE

## 2023-12-21 PROCEDURE — 99213 OFFICE O/P EST LOW 20 MIN: CPT | Performed by: FAMILY MEDICINE

## 2023-12-21 PROCEDURE — 1160F RVW MEDS BY RX/DR IN RCRD: CPT | Performed by: FAMILY MEDICINE

## 2023-12-21 PROCEDURE — 1159F MED LIST DOCD IN RCRD: CPT | Performed by: FAMILY MEDICINE

## 2023-12-21 PROCEDURE — 1126F AMNT PAIN NOTED NONE PRSNT: CPT | Performed by: FAMILY MEDICINE

## 2023-12-21 PROCEDURE — 1036F TOBACCO NON-USER: CPT | Performed by: FAMILY MEDICINE

## 2023-12-21 RX ORDER — DOXYCYCLINE 100 MG/1
100 CAPSULE ORAL 2 TIMES DAILY
Qty: 14 CAPSULE | Refills: 0 | Status: SHIPPED | OUTPATIENT
Start: 2023-12-21 | End: 2023-12-28

## 2023-12-21 NOTE — ASSESSMENT & PLAN NOTE
Treat with Doxycycline based on allergies  Recommend symptomatic treatment including increased hydration, regular nasal saline, use of analgesics such as acetaminophen/ibuprofen   Follow up if symptoms fail to improve

## 2023-12-21 NOTE — PROGRESS NOTES
"Subjective   Patient ID: Kalyani Polk is a 78 y.o. female who presents for Sinusitis (Numbness on fingers ).    HPI   Has had a headache, blocked ears, congestion.  Going on for awhile.  Has been on other medication in the past and symptoms improved somewhat but returned.  No fever or chills.    Review of Systems  Negative unless noted in HPI    Objective   /72 (BP Location: Right arm, Patient Position: Sitting, BP Cuff Size: Adult)   Pulse 102   Ht 1.613 m (5' 3.5\")   Wt 74.3 kg (163 lb 12.8 oz)   SpO2 98%   BMI 28.56 kg/m²     Physical Exam  Constitutional:       Appearance: Normal appearance.   HENT:      Right Ear: Tympanic membrane, ear canal and external ear normal.      Left Ear: Tympanic membrane, ear canal and external ear normal.      Nose: Congestion present.      Mouth/Throat:      Mouth: Mucous membranes are moist.   Cardiovascular:      Rate and Rhythm: Normal rate and regular rhythm.   Pulmonary:      Effort: Pulmonary effort is normal.      Breath sounds: Normal breath sounds.   Neurological:      Mental Status: She is alert.         Assessment/Plan   Problem List Items Addressed This Visit             ICD-10-CM    Acute non-recurrent maxillary sinusitis - Primary J01.00     Treat with Doxycycline based on allergies  Recommend symptomatic treatment including increased hydration, regular nasal saline, use of analgesics such as acetaminophen/ibuprofen   Follow up if symptoms fail to improve           Relevant Medications    doxycycline (Vibramycin) 100 mg capsule          "

## 2024-01-10 ENCOUNTER — TELEPHONE (OUTPATIENT)
Dept: PRIMARY CARE | Facility: CLINIC | Age: 79
End: 2024-01-10
Payer: MEDICARE

## 2024-01-10 DIAGNOSIS — R23.8 OTHER SKIN CHANGES: Primary | ICD-10-CM

## 2024-01-11 DIAGNOSIS — E04.2 MULTIPLE THYROID NODULES: Primary | ICD-10-CM

## 2024-01-11 NOTE — TELEPHONE ENCOUNTER
Called Patient asked Why she wanted Thyroid Checked, Stated it was last Written On her last Visit Summary on 11/2023. I also Informed Her that the Referral For The Dermatologist was Sent.       I Received Requistion Form From Dr. Hernandez For Thyroid Check, Called and Informed Patient and asked If she would Like Me To Mail The Requisition To Her She stated yes. I Sent The Form Out To be Mailed.

## 2024-01-17 DIAGNOSIS — J30.89 ALLERGIC RHINITIS DUE TO OTHER ALLERGIC TRIGGER, UNSPECIFIED SEASONALITY: ICD-10-CM

## 2024-01-17 RX ORDER — LEVOCETIRIZINE DIHYDROCHLORIDE 5 MG/1
5 TABLET, FILM COATED ORAL EVERY EVENING
Qty: 90 TABLET | Refills: 1 | Status: SHIPPED | OUTPATIENT
Start: 2024-01-17

## 2024-01-23 DIAGNOSIS — K21.9 GASTROESOPHAGEAL REFLUX DISEASE WITHOUT ESOPHAGITIS: ICD-10-CM

## 2024-01-24 DIAGNOSIS — E78.2 MIXED HYPERLIPIDEMIA: ICD-10-CM

## 2024-01-24 RX ORDER — OMEPRAZOLE 20 MG/1
CAPSULE, DELAYED RELEASE ORAL
Qty: 90 CAPSULE | Refills: 1 | Status: SHIPPED | OUTPATIENT
Start: 2024-01-24 | End: 2024-06-04

## 2024-01-24 RX ORDER — ATORVASTATIN CALCIUM 10 MG/1
10 TABLET, FILM COATED ORAL DAILY
Qty: 90 TABLET | Refills: 3 | Status: SHIPPED | OUTPATIENT
Start: 2024-01-24

## 2024-01-25 ENCOUNTER — OFFICE VISIT (OUTPATIENT)
Dept: DERMATOLOGY | Facility: CLINIC | Age: 79
End: 2024-01-25
Payer: MEDICARE

## 2024-01-25 DIAGNOSIS — L81.1 MELASMA: Primary | ICD-10-CM

## 2024-01-25 PROCEDURE — 1126F AMNT PAIN NOTED NONE PRSNT: CPT

## 2024-01-25 PROCEDURE — 99203 OFFICE O/P NEW LOW 30 MIN: CPT

## 2024-01-25 PROCEDURE — 1036F TOBACCO NON-USER: CPT

## 2024-01-25 NOTE — PROGRESS NOTES
-noticed darkening of skin of face over last few months  -has been going on for a couple of years  -no new products on face other than aquaphor  -no diabetes  -has noticed in pictures significant darkening of face      Subjective   HPI: Kalyani Polk is a 78 y.o. female is a new patient here for evaluation and treatment of darkening of face.   -noticed darkening of skin of face over last few months  -has been going on for a couple of years  -no new products on face other than aquaphor  -no diabetes  -has noticed in pictures significant darkening of face    ROS: No other skin or systemic complaints other than what is documented elsewhere in the note.    ALLERGIES: Penicillins and Sulfa (sulfonamide antibiotics)    SOCIAL:  reports that she has never smoked. She has never been exposed to tobacco smoke. She has never used smokeless tobacco. She reports that she does not drink alcohol and does not use drugs.    Specialty Problems          Dermatology Problems    Contusion of right foot    Insect bite of right upper arm       Objective   Head - Anterior (Face)  Significant darkening of forehead, cheeks, and upper lip        Assessment/Plan   1. Melasma  Head - Anterior (Face)    Start:  Bleachy's cream    The benign nature of this condition and treatment options were discussed extensively with the patient in the office today and reassurance provided.  Most importantly, I emphasized the importance of sun avoidance and sun protection with daily sunscreen use, including the use of a daily facial moisturizer with SPF 60+.    In addition, I recommend topical bleaching therapy with Bleachy's cream at night.  The risks, benefits, and side effects of this medication, including the redness, dryness, and irritation expected with its use, were discussed. Patient expressed understanding and is in agreement with this plan.           FOLLOW UP: 4-6 weeks    The patient was encouraged to contact me with any further questions or  concerns.  Shira Lorenzo PA-C  1/28/2024

## 2024-01-29 ENCOUNTER — OFFICE VISIT (OUTPATIENT)
Dept: PRIMARY CARE | Facility: CLINIC | Age: 79
End: 2024-01-29
Payer: MEDICARE

## 2024-01-29 VITALS
SYSTOLIC BLOOD PRESSURE: 112 MMHG | HEART RATE: 81 BPM | TEMPERATURE: 98.2 F | DIASTOLIC BLOOD PRESSURE: 68 MMHG | HEIGHT: 63 IN | OXYGEN SATURATION: 98 % | BODY MASS INDEX: 29.41 KG/M2 | WEIGHT: 166 LBS

## 2024-01-29 DIAGNOSIS — E04.9 THYROID ENLARGED: Primary | ICD-10-CM

## 2024-01-29 PROCEDURE — 1159F MED LIST DOCD IN RCRD: CPT | Performed by: FAMILY MEDICINE

## 2024-01-29 PROCEDURE — 3074F SYST BP LT 130 MM HG: CPT | Performed by: FAMILY MEDICINE

## 2024-01-29 PROCEDURE — 99212 OFFICE O/P EST SF 10 MIN: CPT | Performed by: FAMILY MEDICINE

## 2024-01-29 PROCEDURE — 1160F RVW MEDS BY RX/DR IN RCRD: CPT | Performed by: FAMILY MEDICINE

## 2024-01-29 PROCEDURE — 3078F DIAST BP <80 MM HG: CPT | Performed by: FAMILY MEDICINE

## 2024-01-29 PROCEDURE — 1126F AMNT PAIN NOTED NONE PRSNT: CPT | Performed by: FAMILY MEDICINE

## 2024-01-29 PROCEDURE — 1036F TOBACCO NON-USER: CPT | Performed by: FAMILY MEDICINE

## 2024-01-29 ASSESSMENT — ENCOUNTER SYMPTOMS
LOSS OF SENSATION IN FEET: 0
OCCASIONAL FEELINGS OF UNSTEADINESS: 0
DEPRESSION: 0

## 2024-01-29 NOTE — PATIENT INSTRUCTIONS
Needing ultrasound for follow up of thyroid nodules.  Will be doing at  [facility].    Allergies ongoing, stable.    BP  controlled.

## 2024-01-29 NOTE — PROGRESS NOTES
"Subjective   Patient ID: Kalyani Polk is a 78 y.o. female who presents for Follow-up (Patiem) and Ear Fullness (Patient has Ear Fullness in Right Ear That started a few Months Back that does Not Occur Everyday.).  HPI    Review of Systems    Objective   /68 (BP Location: Left arm, Patient Position: Sitting, BP Cuff Size: Adult)   Pulse 81   Temp 36.8 °C (98.2 °F) (Oral)   Ht 1.6 m (5' 3\")   Wt 75.3 kg (166 lb)   SpO2 98%   BMI 29.41 kg/m²     Physical Exam  Vitals reviewed.   Constitutional:       Appearance: Normal appearance.   HENT:      Right Ear: Tympanic membrane and ear canal normal.      Left Ear: Tympanic membrane and ear canal normal.   Neck:      Comments: TMG enlarged  Cardiovascular:      Rate and Rhythm: Normal rate and regular rhythm.      Heart sounds: No murmur heard.  Pulmonary:      Effort: Pulmonary effort is normal.      Breath sounds: Normal breath sounds.   Musculoskeletal:      Right lower leg: No edema.      Left lower leg: No edema.   Lymphadenopathy:      Cervical: No cervical adenopathy.   Neurological:      Mental Status: She is alert.           Assessment/Plan   Problem List Items Addressed This Visit       Thyroid enlarged - Primary    Relevant Orders    US thyroid          "

## 2024-02-19 ENCOUNTER — HOSPITAL ENCOUNTER (OUTPATIENT)
Dept: RADIOLOGY | Facility: HOSPITAL | Age: 79
Discharge: HOME | End: 2024-02-19
Payer: MEDICARE

## 2024-02-19 DIAGNOSIS — E04.2 MULTIPLE THYROID NODULES: ICD-10-CM

## 2024-02-19 PROCEDURE — 76536 US EXAM OF HEAD AND NECK: CPT

## 2024-02-19 PROCEDURE — 76536 US EXAM OF HEAD AND NECK: CPT | Performed by: RADIOLOGY

## 2024-02-22 NOTE — RESULT ENCOUNTER NOTE
The thyroid nodules appeared to be  stable. Nothing suspicious seen.  Recheck in 1 year to continue to monitor.

## 2024-02-29 ENCOUNTER — OFFICE VISIT (OUTPATIENT)
Dept: DERMATOLOGY | Facility: CLINIC | Age: 79
End: 2024-02-29
Payer: MEDICARE

## 2024-02-29 DIAGNOSIS — L81.1 MELASMA: ICD-10-CM

## 2024-02-29 PROCEDURE — 1126F AMNT PAIN NOTED NONE PRSNT: CPT

## 2024-02-29 PROCEDURE — 1159F MED LIST DOCD IN RCRD: CPT

## 2024-02-29 PROCEDURE — 1160F RVW MEDS BY RX/DR IN RCRD: CPT

## 2024-02-29 PROCEDURE — 99212 OFFICE O/P EST SF 10 MIN: CPT

## 2024-02-29 PROCEDURE — 1036F TOBACCO NON-USER: CPT

## 2024-03-13 ENCOUNTER — OFFICE VISIT (OUTPATIENT)
Dept: OTOLARYNGOLOGY | Facility: CLINIC | Age: 79
End: 2024-03-13
Payer: MEDICARE

## 2024-03-13 VITALS — BODY MASS INDEX: 28.52 KG/M2 | WEIGHT: 161 LBS

## 2024-03-13 DIAGNOSIS — J31.0 CHRONIC RHINITIS: ICD-10-CM

## 2024-03-13 DIAGNOSIS — H69.93 DYSFUNCTION OF BOTH EUSTACHIAN TUBES: Primary | ICD-10-CM

## 2024-03-13 PROCEDURE — 1036F TOBACCO NON-USER: CPT | Performed by: GENERAL PRACTICE

## 2024-03-13 PROCEDURE — 1159F MED LIST DOCD IN RCRD: CPT | Performed by: GENERAL PRACTICE

## 2024-03-13 PROCEDURE — 99203 OFFICE O/P NEW LOW 30 MIN: CPT | Performed by: GENERAL PRACTICE

## 2024-03-13 PROCEDURE — 1160F RVW MEDS BY RX/DR IN RCRD: CPT | Performed by: GENERAL PRACTICE

## 2024-03-13 RX ORDER — LEVOCETIRIZINE DIHYDROCHLORIDE 5 MG/1
5 TABLET, FILM COATED ORAL EVERY EVENING
Qty: 30 TABLET | Refills: 2 | Status: SHIPPED | OUTPATIENT
Start: 2024-03-13 | End: 2025-03-13

## 2024-03-13 RX ORDER — MUPIROCIN 20 MG/G
OINTMENT TOPICAL 2 TIMES DAILY
Qty: 22 G | Refills: 2 | Status: SHIPPED | OUTPATIENT
Start: 2024-03-13 | End: 2024-03-27

## 2024-03-13 NOTE — PROGRESS NOTES
Otolaryngology - Head and Neck Surgery Outpatient New Patient Visit Note        Assessment/Plan   Problem List Items Addressed This Visit    None  Visit Diagnoses         Codes    Dysfunction of both eustachian tubes    -  Primary H69.93    Chronic rhinitis     J31.0    Relevant Medications    mupirocin (Bactroban) 2 % ointment    levocetirizine (Xyzal) 5 mg tablet            78yoF with recent R>L ETD symptoms in the setting of allergic rhinitis.  No otitis on exam.   Discussed control for allergic rhinitis with xyzal (poor response to other antihistamines).  Some mild nasal mucositis, will trial mupirocin for 2 weeks then saline gel.           Follow up:  -plan for follow up in clinic as needed    All of the above findings, impressions, treatment planning and follow up plans were discussed with the patient who indicated understanding.  the patient was instructed to contact or return to clinic sooner if symptoms/signs persist or worsen despite the above management.      Luis Antonio Shipley MD  Otolaryngology - Head and Neck Surgery            History Of Present Illness  Kalyani Polk is a 78 y.o. female presenting for bothersome R>L ear fullness/pressure and popping.   Mild muffled hearing associated.    No otalgia, otorrhea.    No significant hearing loss.      Reports a history of bothersome PND, congestion and nonpurulent rhinorrhea.   Notes sneezing and eye itching.      Reports use of flonase and zyrtec (and other antihistamines) with minimal effect.    Notes some dry irritation and discomfort in nose.    Denies significant recurrent sinusitis.    Repors a history of GERD controlled with omeprazole.                 Past Medical History  She has a past medical history of Personal history of other malignant neoplasm of large intestine (01/17/2021).    Surgical History  She has a past surgical history that includes Other surgical history (01/17/2021); Other surgical history (01/17/2021); and MR head angio w IV contrast  (12/6/2023).     Social History  She reports that she has never smoked. She has never been exposed to tobacco smoke. She has never used smokeless tobacco. She reports that she does not drink alcohol and does not use drugs.    Family History  Family History   Problem Relation Name Age of Onset    Dementia Mother      Osteoporosis Mother      Other (neoplasm of colon) Father      Other (CVA) Sister          Allergies  Penicillins and Sulfa (sulfonamide antibiotics)    Review of Systems  ROS: Pertinent positives as noted in HPI.    - CONSTITUTIONAL: Does not report weight loss, fever or chills.    - HEENT:   Ear: Does not report tinnitus, vertigo, hearing loss, otalgia, otorrhea  Nose: Does not report  ,  , epistaxis, decreased smell  Throat: Does not report pain, dysphagia, odynophagia  Larynx: Does not report hoarseness,  difficulty breathing, pain with speaking (odynophonia)  Neck: Does not report new masses, pain, swelling  Face: Does not report sinus pain, pressure, swelling, numbness, weakness     - RESPIRATORY: Does not report SOB or cough.    - CV: Does not report palpitations or chest pain.     - GI: Does not report abdominal pain, nausea, vomiting or diarrhea.    - : Does not report dysuria or urinary frequency.    - MSK: Does not report myalgia or joint pain.    - SKIN: Does not report rash or pruritus.    - NEUROLOGICAL: Does not report headache or syncope.    - PSYCHIATRIC: Does not report recent changes in mood. Does not report anxiety or depression.         Physical Exam:     GENERAL:   Alert & Oriented to person, place and time; Normal affect and appearance. Well developed and well nourished. Conversant & cooperative with examination.     HEAD:   Normocephalic, atraumatic. No sinus tenderness to palpation. Normal parotid bilaterally. Normal facial strength.     NEUROLOGIC:   Cranial nerves II-XII grossly intact, gait WNL. Normal mood and affect.    EYES:   Extraocular movements intact. Pupils equal,  round, reactive to light and accommodation. No nystagmus, no ptosis. no scleral injection.    EAR:   Normal auricle. No discomfort or TTP with manipulation.   Handheld otoscopic exam showed normal external auditory canals bilaterally. No purulence or EAC inflammation. Minimal cerumen.   Right tympanic membrane clear and mobile without evidence of perforation, retraction or middle ear effusion.   Left tympanic membrane clear and mobile without evidence of perforation, retraction or middle ear effusion.     NOSE:   No external deformity. No external nasal lesions, lacerations, or scars. Nasal tip symmetrical with normal nasal valves.   Nasal cavity with essentially midline septum, erythematous/irritated mucosa and turbinates. Mucoid debris on septum L>R but no lesions, masses, purulence or polyps.     OC/OP:   Mucous membranes moist, no masses, lesions or exudates.   Normal tongue, floor of mouth, teeth, gums, lips. Normal posterior pharyngeal wall.    Normal tonsils without erythema, exudate or obvious calculi     NECK:   No neck masses or thyroid enlargement. Trachea midline. No tenderness to palpation    LYMPHATIC:   No cervical lymphadenopathy.     RESPIRATORY:   Symmetric chest elevation & no retractions. No significant hoarseness. No increased work of breathing.    CV:   No clubbing or cyanosis. No obvious edema    Skin:   No facial rashes, vesicles or lesions.     Extremities:   No gross abnormalities      Clinic Procedure        Information review:  External sources (notes, imaging, lab results) listed below personally reviewed to aid in medical decision making process.  -  -  -

## 2024-05-03 ENCOUNTER — OFFICE VISIT (OUTPATIENT)
Dept: PRIMARY CARE | Facility: CLINIC | Age: 79
End: 2024-05-03
Payer: MEDICARE

## 2024-05-03 VITALS
HEART RATE: 76 BPM | DIASTOLIC BLOOD PRESSURE: 78 MMHG | TEMPERATURE: 97.3 F | OXYGEN SATURATION: 97 % | BODY MASS INDEX: 28.2 KG/M2 | SYSTOLIC BLOOD PRESSURE: 122 MMHG | HEIGHT: 64 IN | WEIGHT: 165.2 LBS

## 2024-05-03 DIAGNOSIS — J01.00 ACUTE NON-RECURRENT MAXILLARY SINUSITIS: Primary | ICD-10-CM

## 2024-05-03 PROCEDURE — 1126F AMNT PAIN NOTED NONE PRSNT: CPT | Performed by: FAMILY MEDICINE

## 2024-05-03 PROCEDURE — 1160F RVW MEDS BY RX/DR IN RCRD: CPT | Performed by: FAMILY MEDICINE

## 2024-05-03 PROCEDURE — 99213 OFFICE O/P EST LOW 20 MIN: CPT | Performed by: FAMILY MEDICINE

## 2024-05-03 PROCEDURE — 3078F DIAST BP <80 MM HG: CPT | Performed by: FAMILY MEDICINE

## 2024-05-03 PROCEDURE — 1036F TOBACCO NON-USER: CPT | Performed by: FAMILY MEDICINE

## 2024-05-03 PROCEDURE — 1159F MED LIST DOCD IN RCRD: CPT | Performed by: FAMILY MEDICINE

## 2024-05-03 PROCEDURE — 3074F SYST BP LT 130 MM HG: CPT | Performed by: FAMILY MEDICINE

## 2024-05-03 RX ORDER — AZITHROMYCIN 250 MG/1
TABLET, FILM COATED ORAL DAILY
Qty: 6 TABLET | Refills: 0 | Status: SHIPPED | OUTPATIENT
Start: 2024-05-03 | End: 2024-05-07

## 2024-05-03 ASSESSMENT — PAIN SCALES - GENERAL: PAINLEVEL: 0-NO PAIN

## 2024-05-03 ASSESSMENT — ENCOUNTER SYMPTOMS
DEPRESSION: 0
LOSS OF SENSATION IN FEET: 0
OCCASIONAL FEELINGS OF UNSTEADINESS: 0

## 2024-05-03 NOTE — PROGRESS NOTES
"Subjective   Patient ID: Kalyani Polk is a 79 y.o. female who presents for Sinusitis and Nasal Congestion.    Sinusitis       Reports seasonal allergies but in the last week has worsened significantly.  A lot of sinus pressure, drainage and congestion.  Mild cough but not much.  No fever or chills. No SOB.  No known sick contacts. Takes Xyzal and Azelastine regularly.    Review of Systems  Negative unless noted in HPI    Objective   /78 (BP Location: Right arm, Patient Position: Sitting, BP Cuff Size: Large adult)   Pulse 76   Temp 36.3 °C (97.3 °F) (Temporal)   Ht 1.613 m (5' 3.5\")   Wt 74.9 kg (165 lb 3.2 oz)   SpO2 97%   BMI 28.80 kg/m²     Physical Exam  Constitutional:       Appearance: Normal appearance.   HENT:      Right Ear: Tympanic membrane, ear canal and external ear normal.      Left Ear: Tympanic membrane, ear canal and external ear normal.      Nose: Congestion and rhinorrhea present.      Right Sinus: Maxillary sinus tenderness present.      Left Sinus: Maxillary sinus tenderness present.   Eyes:      Conjunctiva/sclera: Conjunctivae normal.   Cardiovascular:      Rate and Rhythm: Normal rate and regular rhythm.   Pulmonary:      Effort: Pulmonary effort is normal.      Breath sounds: Normal breath sounds.   Neurological:      Mental Status: She is alert.         Assessment/Plan   Problem List Items Addressed This Visit             ICD-10-CM    Acute non-recurrent maxillary sinusitis - Primary J01.00     Start treatment with antibiotic  Continue antihistamine and nasal spray  Recommend symptomatic treatment including increased hydration, regular nasal saline, use of analgesics such as acetaminophen/ibuprofen   Follow up if symptoms fail to improve           Relevant Medications    azithromycin (Zithromax) 250 mg tablet          "

## 2024-05-03 NOTE — ASSESSMENT & PLAN NOTE
Start treatment with antibiotic  Continue antihistamine and nasal spray  Recommend symptomatic treatment including increased hydration, regular nasal saline, use of analgesics such as acetaminophen/ibuprofen   Follow up if symptoms fail to improve

## 2024-05-20 ENCOUNTER — OFFICE VISIT (OUTPATIENT)
Dept: PRIMARY CARE | Facility: CLINIC | Age: 79
End: 2024-05-20
Payer: MEDICARE

## 2024-05-20 VITALS
TEMPERATURE: 98.6 F | OXYGEN SATURATION: 96 % | HEIGHT: 64 IN | WEIGHT: 164.6 LBS | SYSTOLIC BLOOD PRESSURE: 125 MMHG | HEART RATE: 91 BPM | DIASTOLIC BLOOD PRESSURE: 65 MMHG | BODY MASS INDEX: 28.1 KG/M2

## 2024-05-20 DIAGNOSIS — R73.09 ELEVATED GLUCOSE: ICD-10-CM

## 2024-05-20 DIAGNOSIS — K58.1 IRRITABLE BOWEL SYNDROME WITH CONSTIPATION: ICD-10-CM

## 2024-05-20 DIAGNOSIS — I10 BENIGN ESSENTIAL HYPERTENSION: ICD-10-CM

## 2024-05-20 DIAGNOSIS — S29.019A THORACIC MYOFASCIAL STRAIN, INITIAL ENCOUNTER: Primary | ICD-10-CM

## 2024-05-20 DIAGNOSIS — E78.2 MIXED HYPERLIPIDEMIA: ICD-10-CM

## 2024-05-20 PROCEDURE — 3078F DIAST BP <80 MM HG: CPT | Performed by: FAMILY MEDICINE

## 2024-05-20 PROCEDURE — 1160F RVW MEDS BY RX/DR IN RCRD: CPT | Performed by: FAMILY MEDICINE

## 2024-05-20 PROCEDURE — 1158F ADVNC CARE PLAN TLK DOCD: CPT | Performed by: FAMILY MEDICINE

## 2024-05-20 PROCEDURE — 99213 OFFICE O/P EST LOW 20 MIN: CPT | Performed by: FAMILY MEDICINE

## 2024-05-20 PROCEDURE — 1123F ACP DISCUSS/DSCN MKR DOCD: CPT | Performed by: FAMILY MEDICINE

## 2024-05-20 PROCEDURE — 1159F MED LIST DOCD IN RCRD: CPT | Performed by: FAMILY MEDICINE

## 2024-05-20 PROCEDURE — 3074F SYST BP LT 130 MM HG: CPT | Performed by: FAMILY MEDICINE

## 2024-05-20 ASSESSMENT — ANXIETY QUESTIONNAIRES
2. NOT BEING ABLE TO STOP OR CONTROL WORRYING: NOT AT ALL
3. WORRYING TOO MUCH ABOUT DIFFERENT THINGS: NOT AT ALL
4. TROUBLE RELAXING: NOT AT ALL
7. FEELING AFRAID AS IF SOMETHING AWFUL MIGHT HAPPEN: NOT AT ALL
GAD7 TOTAL SCORE: 0
1. FEELING NERVOUS, ANXIOUS, OR ON EDGE: NOT AT ALL
5. BEING SO RESTLESS THAT IT IS HARD TO SIT STILL: NOT AT ALL
6. BECOMING EASILY ANNOYED OR IRRITABLE: NOT AT ALL

## 2024-05-20 ASSESSMENT — ENCOUNTER SYMPTOMS
LOSS OF SENSATION IN FEET: 0
OCCASIONAL FEELINGS OF UNSTEADINESS: 0
DEPRESSION: 0

## 2024-05-20 NOTE — PROGRESS NOTES
"Subjective   Patient ID: Kalyani Polk is a 79 y.o. female who presents for Back Pain (LEFT SIDE ).  Patient initially made the appointment for some upper back pain, but it has gotten better.  Pain is in the left shoulder blade area.  She  was having spasm type pains.  Had been switching out summer and winter clothes, so  had been lifting boxes.  Thinks she may have pulled a muscle.  No chest pain. No SOB.  No pain down her arms.  Last night used Lidocaine patch and ibuprofen, which has seemed to help.    Also having some indigestion.  Hasn't had blood work done recently, so  interested in checking whatever she needs.      Back Pain        Review of Systems   Musculoskeletal:  Positive for back pain.       Objective   /65   Pulse 91   Temp 37 °C (98.6 °F)   Ht 1.613 m (5' 3.5\")   Wt 74.7 kg (164 lb 9.6 oz)   SpO2 96%   BMI 28.70 kg/m²     Physical Exam  Vitals reviewed.   Constitutional:       Appearance: Normal appearance.   Cardiovascular:      Rate and Rhythm: Normal rate and regular rhythm.      Heart sounds: No murmur heard.  Pulmonary:      Effort: Pulmonary effort is normal.      Breath sounds: Normal breath sounds.   Musculoskeletal:      Thoracic back: Tenderness present. No swelling or spasms. Normal range of motion.        Back:       Right lower leg: No edema.      Left lower leg: No edema.      Comments: Area of tenderness to palpation.   Neurological:      Mental Status: She is alert.           Assessment/Plan   Problem List Items Addressed This Visit       Benign essential hypertension    Relevant Orders    Comprehensive Metabolic Panel    Lipid Panel    Elevated glucose    Relevant Orders    Hemoglobin A1C    Irritable bowel syndrome with constipation    Mixed hyperlipidemia    Relevant Orders    Lipid Panel     Other Visit Diagnoses       Thoracic myofascial strain, initial encounter    -  Primary                 "

## 2024-05-21 PROBLEM — C80.1 CANCER (MULTI): Status: RESOLVED | Noted: 2023-06-05 | Resolved: 2024-05-21

## 2024-05-21 ASSESSMENT — ENCOUNTER SYMPTOMS: BACK PAIN: 1

## 2024-05-21 NOTE — PATIENT INSTRUCTIONS
Left upper back pain appears to be musculoskeletal, suspect from lifting boxes, and is starting to improve.  Can continue lidocaine patch for 12 hours a day.  Ibuprofen 200-400 mg can be used for pain, but monitor for GI upset.  If not continuing to improve, follow up.    Check fasting blood work some morning related to prediabetes, cholesterol, kidney function.  Fast for 12 hours prior to having blood drawn.  You should drink plenty of water, and can have black tea or black coffee, if you'd like..

## 2024-05-23 ENCOUNTER — LAB (OUTPATIENT)
Dept: LAB | Facility: LAB | Age: 79
End: 2024-05-23
Payer: MEDICARE

## 2024-05-23 DIAGNOSIS — R73.09 ELEVATED GLUCOSE: ICD-10-CM

## 2024-05-23 DIAGNOSIS — E78.2 MIXED HYPERLIPIDEMIA: ICD-10-CM

## 2024-05-23 DIAGNOSIS — I10 BENIGN ESSENTIAL HYPERTENSION: ICD-10-CM

## 2024-05-23 LAB
ALBUMIN SERPL BCP-MCNC: 4 G/DL (ref 3.4–5)
ALP SERPL-CCNC: 46 U/L (ref 33–136)
ALT SERPL W P-5'-P-CCNC: 12 U/L (ref 7–45)
ANION GAP SERPL CALC-SCNC: 14 MMOL/L (ref 10–20)
AST SERPL W P-5'-P-CCNC: 15 U/L (ref 9–39)
BILIRUB SERPL-MCNC: 0.6 MG/DL (ref 0–1.2)
BUN SERPL-MCNC: 16 MG/DL (ref 6–23)
CALCIUM SERPL-MCNC: 8.9 MG/DL (ref 8.6–10.6)
CHLORIDE SERPL-SCNC: 102 MMOL/L (ref 98–107)
CHOLEST SERPL-MCNC: 144 MG/DL (ref 0–199)
CHOLESTEROL/HDL RATIO: 2.6
CO2 SERPL-SCNC: 27 MMOL/L (ref 21–32)
CREAT SERPL-MCNC: 0.99 MG/DL (ref 0.5–1.05)
EGFRCR SERPLBLD CKD-EPI 2021: 57 ML/MIN/1.73M*2
EST. AVERAGE GLUCOSE BLD GHB EST-MCNC: 131 MG/DL
GLUCOSE SERPL-MCNC: 84 MG/DL (ref 74–99)
HBA1C MFR BLD: 6.2 %
HDLC SERPL-MCNC: 56.1 MG/DL
LDLC SERPL CALC-MCNC: 75 MG/DL
NON HDL CHOLESTEROL: 88 MG/DL (ref 0–149)
POTASSIUM SERPL-SCNC: 4 MMOL/L (ref 3.5–5.3)
PROT SERPL-MCNC: 6.9 G/DL (ref 6.4–8.2)
SODIUM SERPL-SCNC: 139 MMOL/L (ref 136–145)
TRIGL SERPL-MCNC: 65 MG/DL (ref 0–149)
VLDL: 13 MG/DL (ref 0–40)

## 2024-05-23 PROCEDURE — 83036 HEMOGLOBIN GLYCOSYLATED A1C: CPT

## 2024-05-23 PROCEDURE — 80061 LIPID PANEL: CPT

## 2024-05-23 PROCEDURE — 36415 COLL VENOUS BLD VENIPUNCTURE: CPT

## 2024-05-23 PROCEDURE — 80053 COMPREHEN METABOLIC PANEL: CPT

## 2024-05-24 NOTE — RESULT ENCOUNTER NOTE
A1C 6.2 is stable in the prediabetic range.  Glucose, electrolytes, liver function normal.  Kidney function is stable.  Cholesterol is ideal.

## 2024-06-04 ENCOUNTER — OFFICE VISIT (OUTPATIENT)
Dept: PRIMARY CARE | Facility: CLINIC | Age: 79
End: 2024-06-04
Payer: MEDICARE

## 2024-06-04 VITALS
DIASTOLIC BLOOD PRESSURE: 78 MMHG | OXYGEN SATURATION: 98 % | HEART RATE: 95 BPM | WEIGHT: 164 LBS | HEIGHT: 64 IN | BODY MASS INDEX: 28 KG/M2 | SYSTOLIC BLOOD PRESSURE: 116 MMHG | TEMPERATURE: 97.5 F

## 2024-06-04 DIAGNOSIS — K21.9 GASTROESOPHAGEAL REFLUX DISEASE WITHOUT ESOPHAGITIS: Primary | ICD-10-CM

## 2024-06-04 PROBLEM — J01.00 ACUTE NON-RECURRENT MAXILLARY SINUSITIS: Status: RESOLVED | Noted: 2023-12-21 | Resolved: 2024-06-04

## 2024-06-04 PROCEDURE — 99214 OFFICE O/P EST MOD 30 MIN: CPT | Performed by: FAMILY MEDICINE

## 2024-06-04 PROCEDURE — 3078F DIAST BP <80 MM HG: CPT | Performed by: FAMILY MEDICINE

## 2024-06-04 PROCEDURE — 1036F TOBACCO NON-USER: CPT | Performed by: FAMILY MEDICINE

## 2024-06-04 PROCEDURE — 3074F SYST BP LT 130 MM HG: CPT | Performed by: FAMILY MEDICINE

## 2024-06-04 PROCEDURE — 1160F RVW MEDS BY RX/DR IN RCRD: CPT | Performed by: FAMILY MEDICINE

## 2024-06-04 PROCEDURE — 1123F ACP DISCUSS/DSCN MKR DOCD: CPT | Performed by: FAMILY MEDICINE

## 2024-06-04 PROCEDURE — 1159F MED LIST DOCD IN RCRD: CPT | Performed by: FAMILY MEDICINE

## 2024-06-04 PROCEDURE — 1126F AMNT PAIN NOTED NONE PRSNT: CPT | Performed by: FAMILY MEDICINE

## 2024-06-04 RX ORDER — PANTOPRAZOLE SODIUM 40 MG/1
40 TABLET, DELAYED RELEASE ORAL DAILY
Qty: 30 TABLET | Refills: 1 | Status: SHIPPED | OUTPATIENT
Start: 2024-06-04 | End: 2024-08-03

## 2024-06-04 ASSESSMENT — PAIN SCALES - GENERAL: PAINLEVEL: 0-NO PAIN

## 2024-06-04 ASSESSMENT — ENCOUNTER SYMPTOMS
LOSS OF SENSATION IN FEET: 0
OCCASIONAL FEELINGS OF UNSTEADINESS: 0
DEPRESSION: 0

## 2024-06-04 NOTE — ASSESSMENT & PLAN NOTE
Suspect secondary to acid reflux  No red flag symptoms at this time  Will trial stronger PPI, pantoprazole, can use famoditine for breakthrough  Monitor for 1-2 weeks, if no improvement in symptoms then can obtain CT scan or refer to GI  Labs are up to date and normal  Discussed if any new symptoms occur please call earlier for reevaluation

## 2024-06-04 NOTE — PROGRESS NOTES
"Subjective   Patient ID: Kalyani oPlk is a 79 y.o. female who presents for GERD.    HPI   Feeling bloating, gas and feeling fullness in the upper GI tract. Cannot tell what is causing the symptoms. Tried to watch her diet. No new medications.  Seems like symptoms are present all day. No changes in the stools.  No nausea or vomiting.  No blood in stool.  Did take ibuprofen 2-3 times and then did eat something different during the holiday weekend.     Has remote history of GI cancer (1986) and had colon cancer up to date.  Thinks she had a previous EGD.    Review of Systems  Negative unless noted in HPI    Objective   /78 (BP Location: Left arm, Patient Position: Sitting, BP Cuff Size: Large adult)   Pulse 95   Temp 36.4 °C (97.5 °F) (Temporal)   Ht 1.613 m (5' 3.5\")   Wt 74.4 kg (164 lb)   SpO2 98%   BMI 28.60 kg/m²     Physical Exam  Constitutional:       Appearance: Normal appearance.   Cardiovascular:      Rate and Rhythm: Normal rate and regular rhythm.   Pulmonary:      Effort: Pulmonary effort is normal.      Breath sounds: Normal breath sounds.   Abdominal:      General: Bowel sounds are increased. There is distension.      Palpations: Abdomen is soft. There is no shifting dullness or fluid wave.      Tenderness: There is no abdominal tenderness. There is no guarding or rebound.   Neurological:      Mental Status: She is alert.         Assessment/Plan   Problem List Items Addressed This Visit             ICD-10-CM    Gastroesophageal reflux disease without esophagitis - Primary K21.9     Suspect secondary to acid reflux  No red flag symptoms at this time  Will trial stronger PPI, pantoprazole, can use famoditine for breakthrough  Monitor for 1-2 weeks, if no improvement in symptoms then can obtain CT scan or refer to GI  Labs are up to date and normal  Discussed if any new symptoms occur please call earlier for reevaluation         Relevant Medications    pantoprazole (ProtoNix) 40 mg EC tablet "

## 2024-06-04 NOTE — PATIENT INSTRUCTIONS
Continue to watch the diet, avoid excessive spices, acidic foods     Start the new medication, Pantoprazole and do not take the Omeprazole    Can take GasX for your symptoms    You can take Famotidine or Pepcid as needed for you symptoms as well

## 2024-06-17 ENCOUNTER — OFFICE VISIT (OUTPATIENT)
Dept: PRIMARY CARE | Facility: CLINIC | Age: 79
End: 2024-06-17
Payer: MEDICARE

## 2024-06-17 VITALS
SYSTOLIC BLOOD PRESSURE: 117 MMHG | TEMPERATURE: 98.3 F | BODY MASS INDEX: 27.39 KG/M2 | DIASTOLIC BLOOD PRESSURE: 71 MMHG | HEART RATE: 119 BPM | OXYGEN SATURATION: 97 % | WEIGHT: 160.4 LBS | HEIGHT: 64 IN

## 2024-06-17 DIAGNOSIS — K58.1 IRRITABLE BOWEL SYNDROME WITH CONSTIPATION: ICD-10-CM

## 2024-06-17 DIAGNOSIS — K21.9 GASTROESOPHAGEAL REFLUX DISEASE WITHOUT ESOPHAGITIS: Primary | ICD-10-CM

## 2024-06-17 DIAGNOSIS — N18.31 STAGE 3A CHRONIC KIDNEY DISEASE (MULTI): ICD-10-CM

## 2024-06-17 PROCEDURE — 99212 OFFICE O/P EST SF 10 MIN: CPT | Performed by: FAMILY MEDICINE

## 2024-06-17 PROCEDURE — 1123F ACP DISCUSS/DSCN MKR DOCD: CPT | Performed by: FAMILY MEDICINE

## 2024-06-17 PROCEDURE — 3078F DIAST BP <80 MM HG: CPT | Performed by: FAMILY MEDICINE

## 2024-06-17 PROCEDURE — 1158F ADVNC CARE PLAN TLK DOCD: CPT | Performed by: FAMILY MEDICINE

## 2024-06-17 PROCEDURE — 1036F TOBACCO NON-USER: CPT | Performed by: FAMILY MEDICINE

## 2024-06-17 PROCEDURE — 3074F SYST BP LT 130 MM HG: CPT | Performed by: FAMILY MEDICINE

## 2024-06-17 PROCEDURE — 1160F RVW MEDS BY RX/DR IN RCRD: CPT | Performed by: FAMILY MEDICINE

## 2024-06-17 PROCEDURE — 1159F MED LIST DOCD IN RCRD: CPT | Performed by: FAMILY MEDICINE

## 2024-06-17 RX ORDER — PANTOPRAZOLE SODIUM 40 MG/1
40 TABLET, DELAYED RELEASE ORAL DAILY
Qty: 90 TABLET | Refills: 0 | Status: SHIPPED | OUTPATIENT
Start: 2024-06-17 | End: 2024-09-15

## 2024-06-17 ASSESSMENT — ANXIETY QUESTIONNAIRES
2. NOT BEING ABLE TO STOP OR CONTROL WORRYING: NOT AT ALL
5. BEING SO RESTLESS THAT IT IS HARD TO SIT STILL: NOT AT ALL
6. BECOMING EASILY ANNOYED OR IRRITABLE: NOT AT ALL
7. FEELING AFRAID AS IF SOMETHING AWFUL MIGHT HAPPEN: NOT AT ALL
4. TROUBLE RELAXING: NOT AT ALL
1. FEELING NERVOUS, ANXIOUS, OR ON EDGE: NOT AT ALL

## 2024-06-17 ASSESSMENT — ENCOUNTER SYMPTOMS
OCCASIONAL FEELINGS OF UNSTEADINESS: 0
LOSS OF SENSATION IN FEET: 0
DEPRESSION: 0

## 2024-06-17 NOTE — PATIENT INSTRUCTIONS
IBS and GERD.  Acid reflux doing better with change to pantoprazole from omeprazole.  Continue dietary changes as well.  Possible history of Doan's esophagus, with last EGD in 2020 (no results available).    For IBS using miralax to help move bowels.  Still a lot of gas and bloating.    Advised to follow up with Dr. Van, since symptoms have worsened.  He may recommend additional testing.

## 2024-06-17 NOTE — PROGRESS NOTES
"Subjective   Patient ID: Kalyani Polk is a 79 y.o. female who presents for Irritable Bowel Syndrome.  Had been dealing with bad heartburn into chest.  Also having a lot of gas and bloating.  Has  to use more miralax.  Seen in our office in early June, and was given pantoprazole. (Had been on omeprazole.)  After 4-5 days it started to help.  Now heartburn is better, but still feels full, and feels like has to burp.  Still having bloating  Hadn't been watching foods as much as used to.  Has gotten back on track with diet.  Tends toward constipation, so uses miralax.  Stool is always in pieces.  With miralax, just has more pieces.  Sees Dr. Van.  Says she doesn't need any more colonoscopies.  Not sure when last one was.  Endoscopy done in 2020, but no results seen in chart.  Possible history of Doan's esophagus.        Review of Systems    Objective   /71   Pulse (!) 119   Temp 36.8 °C (98.3 °F)   Ht 1.613 m (5' 3.5\")   Wt 72.8 kg (160 lb 6.4 oz)   SpO2 97%   BMI 27.97 kg/m²     Physical Exam  Vitals reviewed.   Constitutional:       Appearance: Normal appearance.   Cardiovascular:      Rate and Rhythm: Normal rate and regular rhythm.      Heart sounds: No murmur heard.  Pulmonary:      Effort: Pulmonary effort is normal.      Breath sounds: Normal breath sounds.   Abdominal:      General: There is distension.      Palpations: Abdomen is soft.      Tenderness: There is no abdominal tenderness. There is no guarding or rebound.      Comments: Bowel sounds hyperactive.   Musculoskeletal:      Right lower leg: No edema.      Left lower leg: No edema.   Neurological:      Mental Status: She is alert.           Assessment/Plan   Problem List Items Addressed This Visit       Gastroesophageal reflux disease without esophagitis - Primary    Relevant Medications    pantoprazole (ProtoNix) 40 mg EC tablet    Irritable bowel syndrome with constipation    Stage 3a chronic kidney disease (Multi)     monitoring      "

## 2024-07-16 ENCOUNTER — APPOINTMENT (OUTPATIENT)
Dept: PRIMARY CARE | Facility: CLINIC | Age: 79
End: 2024-07-16
Payer: MEDICARE

## 2024-07-23 DIAGNOSIS — J31.0 CHRONIC RHINITIS: ICD-10-CM

## 2024-07-23 RX ORDER — LEVOCETIRIZINE DIHYDROCHLORIDE 5 MG/1
5 TABLET, FILM COATED ORAL EVERY EVENING
Qty: 90 TABLET | Refills: 2 | Status: SHIPPED | OUTPATIENT
Start: 2024-07-23

## 2024-08-06 ENCOUNTER — APPOINTMENT (OUTPATIENT)
Dept: PRIMARY CARE | Facility: CLINIC | Age: 79
End: 2024-08-06
Payer: MEDICARE

## 2024-08-06 VITALS
HEIGHT: 64 IN | OXYGEN SATURATION: 95 % | SYSTOLIC BLOOD PRESSURE: 122 MMHG | HEART RATE: 100 BPM | DIASTOLIC BLOOD PRESSURE: 72 MMHG | WEIGHT: 161 LBS | TEMPERATURE: 98.3 F | BODY MASS INDEX: 27.49 KG/M2

## 2024-08-06 DIAGNOSIS — I10 BENIGN ESSENTIAL HYPERTENSION: ICD-10-CM

## 2024-08-06 DIAGNOSIS — M76.61 RIGHT ACHILLES TENDINITIS: Primary | ICD-10-CM

## 2024-08-06 PROCEDURE — 3074F SYST BP LT 130 MM HG: CPT | Performed by: FAMILY MEDICINE

## 2024-08-06 PROCEDURE — 1159F MED LIST DOCD IN RCRD: CPT | Performed by: FAMILY MEDICINE

## 2024-08-06 PROCEDURE — 1123F ACP DISCUSS/DSCN MKR DOCD: CPT | Performed by: FAMILY MEDICINE

## 2024-08-06 PROCEDURE — 1160F RVW MEDS BY RX/DR IN RCRD: CPT | Performed by: FAMILY MEDICINE

## 2024-08-06 PROCEDURE — 99212 OFFICE O/P EST SF 10 MIN: CPT | Performed by: FAMILY MEDICINE

## 2024-08-06 PROCEDURE — 3078F DIAST BP <80 MM HG: CPT | Performed by: FAMILY MEDICINE

## 2024-08-06 PROCEDURE — 1036F TOBACCO NON-USER: CPT | Performed by: FAMILY MEDICINE

## 2024-08-06 RX ORDER — AMLODIPINE BESYLATE 10 MG/1
10 TABLET ORAL DAILY
Qty: 90 TABLET | Refills: 1 | Status: SHIPPED | OUTPATIENT
Start: 2024-08-06

## 2024-08-06 ASSESSMENT — ANXIETY QUESTIONNAIRES
3. WORRYING TOO MUCH ABOUT DIFFERENT THINGS: NOT AT ALL
2. NOT BEING ABLE TO STOP OR CONTROL WORRYING: NOT AT ALL
1. FEELING NERVOUS, ANXIOUS, OR ON EDGE: NOT AT ALL
5. BEING SO RESTLESS THAT IT IS HARD TO SIT STILL: NOT AT ALL
4. TROUBLE RELAXING: NOT AT ALL
GAD7 TOTAL SCORE: 0
6. BECOMING EASILY ANNOYED OR IRRITABLE: NOT AT ALL
7. FEELING AFRAID AS IF SOMETHING AWFUL MIGHT HAPPEN: NOT AT ALL

## 2024-08-06 ASSESSMENT — ENCOUNTER SYMPTOMS
DEPRESSION: 0
LOSS OF SENSATION IN FEET: 0
OCCASIONAL FEELINGS OF UNSTEADINESS: 0

## 2024-08-06 NOTE — PATIENT INSTRUCTIONS
[place]'s tendonitis, doing some better.  Continue to wear non-constricting shoes.  Stretches given to do at home.  Can use diclofenac gel to help with inflammation and pain.  If not improving, PT would be helpful.

## 2024-08-06 NOTE — PROGRESS NOTES
"Subjective   Patient ID: Kalyani Polk is a 79 y.o. female who presents for Follow-up (Urgent care ).  Right Ben's tendonitis for past 1-2 months.  Had gone to .  Was given pills to take.  Was doing better, but then wore shoes that aggravated it.  Now using open back slides, and is improving again.  Will ice when sore.  Thinks it originally started from sandals she had worn, and then crossed her feet at the ankle level.        Review of Systems    Objective   /72   Pulse 100   Temp 36.8 °C (98.3 °F)   Ht 1.613 m (5' 3.5\")   Wt 73 kg (161 lb)   SpO2 95%   BMI 28.07 kg/m²     Physical Exam  Vitals reviewed.   Constitutional:       Appearance: Normal appearance.   Cardiovascular:      Pulses:           Dorsalis pedis pulses are 2+ on the right side.   Musculoskeletal:      Right ankle: No swelling. Normal range of motion.      Right Achilles Tendon: Tenderness present. No defects. Mora's test negative.      Right foot: Normal range of motion.      Comments: No thickening of achilles tendon.   Feet:      Right foot:      Skin integrity: Skin integrity normal.   Neurological:      Mental Status: She is alert.           Assessment/Plan   Problem List Items Addressed This Visit    None  Visit Diagnoses       Right Achilles tendinitis    -  Primary               "

## 2024-09-09 ENCOUNTER — APPOINTMENT (OUTPATIENT)
Dept: RADIOLOGY | Facility: HOSPITAL | Age: 79
End: 2024-09-09
Payer: MEDICARE

## 2024-09-09 ENCOUNTER — HOSPITAL ENCOUNTER (EMERGENCY)
Facility: HOSPITAL | Age: 79
Discharge: HOME | End: 2024-09-09
Payer: MEDICARE

## 2024-09-09 VITALS
OXYGEN SATURATION: 100 % | DIASTOLIC BLOOD PRESSURE: 83 MMHG | WEIGHT: 161 LBS | SYSTOLIC BLOOD PRESSURE: 150 MMHG | HEART RATE: 98 BPM | RESPIRATION RATE: 16 BRPM | TEMPERATURE: 97.6 F | HEIGHT: 63 IN | BODY MASS INDEX: 28.53 KG/M2

## 2024-09-09 DIAGNOSIS — M79.641 RIGHT HAND PAIN: Primary | ICD-10-CM

## 2024-09-09 PROCEDURE — 73030 X-RAY EXAM OF SHOULDER: CPT | Mod: RT

## 2024-09-09 PROCEDURE — 73080 X-RAY EXAM OF ELBOW: CPT | Mod: RT

## 2024-09-09 PROCEDURE — 99284 EMERGENCY DEPT VISIT MOD MDM: CPT

## 2024-09-09 PROCEDURE — 73080 X-RAY EXAM OF ELBOW: CPT | Mod: RIGHT SIDE | Performed by: SURGERY

## 2024-09-09 PROCEDURE — 2500000001 HC RX 250 WO HCPCS SELF ADMINISTERED DRUGS (ALT 637 FOR MEDICARE OP): Performed by: SURGERY

## 2024-09-09 PROCEDURE — 73110 X-RAY EXAM OF WRIST: CPT | Mod: RT

## 2024-09-09 PROCEDURE — 73110 X-RAY EXAM OF WRIST: CPT | Mod: RIGHT SIDE | Performed by: SURGERY

## 2024-09-09 PROCEDURE — 2500000004 HC RX 250 GENERAL PHARMACY W/ HCPCS (ALT 636 FOR OP/ED): Performed by: SURGERY

## 2024-09-09 PROCEDURE — 73030 X-RAY EXAM OF SHOULDER: CPT | Mod: RIGHT SIDE | Performed by: SURGERY

## 2024-09-09 RX ORDER — IBUPROFEN 600 MG/1
600 TABLET ORAL EVERY 6 HOURS PRN
Qty: 28 TABLET | Refills: 0 | Status: SHIPPED | OUTPATIENT
Start: 2024-09-09 | End: 2024-09-16

## 2024-09-09 RX ORDER — PREDNISONE 20 MG/1
40 TABLET ORAL DAILY
Qty: 10 TABLET | Refills: 0 | Status: SHIPPED | OUTPATIENT
Start: 2024-09-09 | End: 2024-09-14

## 2024-09-09 RX ORDER — IBUPROFEN 400 MG/1
800 TABLET ORAL ONCE
Status: COMPLETED | OUTPATIENT
Start: 2024-09-09 | End: 2024-09-09

## 2024-09-09 RX ORDER — PREDNISONE 20 MG/1
40 TABLET ORAL ONCE
Status: COMPLETED | OUTPATIENT
Start: 2024-09-09 | End: 2024-09-09

## 2024-09-09 ASSESSMENT — PAIN - FUNCTIONAL ASSESSMENT: PAIN_FUNCTIONAL_ASSESSMENT: 0-10

## 2024-09-09 ASSESSMENT — PAIN DESCRIPTION - PAIN TYPE: TYPE: CHRONIC PAIN

## 2024-09-09 ASSESSMENT — PAIN SCALES - GENERAL: PAINLEVEL_OUTOF10: 10 - WORST POSSIBLE PAIN

## 2024-09-09 ASSESSMENT — PAIN DESCRIPTION - LOCATION: LOCATION: WRIST

## 2024-09-09 ASSESSMENT — PAIN DESCRIPTION - ORIENTATION: ORIENTATION: RIGHT

## 2024-09-09 ASSESSMENT — COLUMBIA-SUICIDE SEVERITY RATING SCALE - C-SSRS
1. IN THE PAST MONTH, HAVE YOU WISHED YOU WERE DEAD OR WISHED YOU COULD GO TO SLEEP AND NOT WAKE UP?: NO
6. HAVE YOU EVER DONE ANYTHING, STARTED TO DO ANYTHING, OR PREPARED TO DO ANYTHING TO END YOUR LIFE?: NO
2. HAVE YOU ACTUALLY HAD ANY THOUGHTS OF KILLING YOURSELF?: NO

## 2024-09-09 NOTE — ED PROVIDER NOTES
Chief Complaint   Patient presents with    Wrist Pain     HPI:   Kalyani Polk is an 79 y.o. female with a history of carpal tunnel presenting to the ED for chief complaint of right wrist pain.  She denies specific injury.  She reports having history of carpal tunnel and needed injections into this wrist previously.  States that the pain prevented her from going to sleep tonight.  The pain is into all 5 fingertips and she does report numbness and tingling in all 5 fingers.  She explains that the pain does radiate up to her elbow at times.  The pain is worsened when she lays on her right shoulder.She explains she has been using 200 mg of ibuprofen with no relief.  Tylenol has not been helping.  Denies swelling.  No fever chills or sweats.        Allergies   Allergen Reactions    Penicillins Unknown    Sulfa (Sulfonamide Antibiotics) Unknown   :  Past Medical History:   Diagnosis Date    Personal history of other malignant neoplasm of large intestine 01/17/2021    History of malignant neoplasm of colon     Past Surgical History:   Procedure Laterality Date    MR HEAD ANGIO W IV CONTRAST  12/6/2023    MR HEAD ANGIO W IV CONTRAST 12/6/2023 EF RAD EXTERNAL FILM    OTHER SURGICAL HISTORY  01/17/2021    Colon surgery    OTHER SURGICAL HISTORY  01/17/2021    Tubal ligation     Family History   Problem Relation Name Age of Onset    Dementia Mother      Osteoporosis Mother      Other (neoplasm of colon) Father      Other (CVA) Sister          Physical Exam  Vitals and nursing note reviewed.   Constitutional:       General: She is not in acute distress.     Appearance: She is well-developed.   HENT:      Head: Normocephalic and atraumatic.      Right Ear: Tympanic membrane normal.      Left Ear: Tympanic membrane normal.      Nose: Nose normal.      Mouth/Throat:      Mouth: Mucous membranes are moist.      Pharynx: Oropharynx is clear.   Eyes:      Extraocular Movements: Extraocular movements intact.      Conjunctiva/sclera:  Conjunctivae normal.      Pupils: Pupils are equal, round, and reactive to light.   Cardiovascular:      Rate and Rhythm: Normal rate and regular rhythm.      Heart sounds: No murmur heard.  Pulmonary:      Effort: Pulmonary effort is normal. No respiratory distress.      Breath sounds: Normal breath sounds.   Abdominal:      Palpations: Abdomen is soft.      Tenderness: There is no abdominal tenderness.   Musculoskeletal:         General: No swelling.      Cervical back: Neck supple.      Comments: Exam of the left wrist shows full range of motion moves smoothly and freely without significant pain or swelling.  There is mild swelling over the anterior medial aspect of the wrist without significant erythema.  Positive Finkelstein.  Positive Tinel.  Negative ulnar tunnel.  Full range of motion of the shoulder with good strength good stability.  Mild thenar wasting.  Good distal pulses   Skin:     General: Skin is warm and dry.      Capillary Refill: Capillary refill takes less than 2 seconds.   Neurological:      Mental Status: She is alert.   Psychiatric:         Mood and Affect: Mood normal.        VS: As documented in the triage note and EMR flowsheet from this visit were reviewed.    Medical Decision Making: This is an 79-year-old female presenting to the ED for atraumatic right wrist and hand pain for 1 day.  She denies injury.  On exam her vitals are stable.  The wrist is not significantly swollen red hot or tender.  It does move smoothly and freely.  I have low suspicion for intra-articular pathology.  She did have positive Phalen and Tinel and Finkelstein.  Higher suspicion for musculoskeletal etiology versus carpal tunnel.  Patient was medicated with prednisone and 600 mg of Motrin and did feel great relief of her symptoms.  X-rays were obtained and showed no acute pathology.  It did showed mild arthritic changes.  No pathology at the elbow or shoulder.  She is appropriate for outpatient management.  I will  refer her back to Dr. Marcos Diaz who did her carpal tunnel injection last time.  I will place her on prednisone and Motrin for outpatient management.      Counseling: Spoke with the patient and discussed today´s findings, in addition to providing specific details for the plan of care and expected course.  Patient was given the opportunity to ask questions.    Discussed return precautions and importance of follow-up.  Advised to follow-up with Orthopedics.  I specifically advised to return to the ED for changing or worsening symptoms, new symptoms, complaint specific precautions, and precautions listed on the discharge paperwork.  Educated on the common potential side effects of medications prescribed.    I advised the patient that the emergency evaluation and treatment provided today doesn't end their need for medical care. It is very important that they follow-up with their primary care provider or other specialist as instructed.    The plan of care was mutually agreed upon with the patient. The patient and/or family were given the opportunity to ask questions. All questions asked today in the ED were answered to the best of my ability with today's information.    This patient was cared for in the setting of nationwide stress on resources and staffing.    This report was transcribed using voice recognition software.  Every effort was made to ensure accuracy, however, inadvertently computerized transcription errors may be present.       Miller Mcdermott PA-C  09/09/24 0619

## 2024-09-09 NOTE — ED TRIAGE NOTES
Patient stated she has been experiencing pain throughout her hand and wrist, but today it extended up her right shoulder. The patient has a diagnosis of carpule tunnel. The patient stated she has a history of htn.

## 2024-09-17 DIAGNOSIS — J01.00 ACUTE NON-RECURRENT MAXILLARY SINUSITIS: Primary | ICD-10-CM

## 2024-09-17 RX ORDER — AZELASTINE 1 MG/ML
2 SPRAY, METERED NASAL 2 TIMES DAILY
Qty: 30 ML | Refills: 1 | Status: SHIPPED | OUTPATIENT
Start: 2024-09-17

## 2024-09-17 NOTE — TELEPHONE ENCOUNTER
Rx Refill Request Telephone Encounter    Name:  Kalyani Polk  :  [id number]  Medication Name:    azelastine (Astelin) 137 mcg (0.1 %) nasal spray     Specific Pharmacy location:  [facility] [phone]

## 2024-09-18 ENCOUNTER — OFFICE VISIT (OUTPATIENT)
Dept: PRIMARY CARE | Facility: CLINIC | Age: 79
End: 2024-09-18
Payer: MEDICARE

## 2024-09-18 VITALS
OXYGEN SATURATION: 95 % | HEART RATE: 85 BPM | DIASTOLIC BLOOD PRESSURE: 61 MMHG | SYSTOLIC BLOOD PRESSURE: 119 MMHG | TEMPERATURE: 98.3 F | BODY MASS INDEX: 29.7 KG/M2 | HEIGHT: 63 IN | WEIGHT: 167.6 LBS

## 2024-09-18 DIAGNOSIS — R73.01 ELEVATED FASTING GLUCOSE: ICD-10-CM

## 2024-09-18 DIAGNOSIS — Z23 NEED FOR INFLUENZA VACCINATION: ICD-10-CM

## 2024-09-18 DIAGNOSIS — R60.0 BILATERAL LOWER EXTREMITY EDEMA: Primary | ICD-10-CM

## 2024-09-18 LAB
ALBUMIN SERPL BCP-MCNC: 3.9 G/DL (ref 3.4–5)
ALP SERPL-CCNC: 44 U/L (ref 33–136)
ALT SERPL W P-5'-P-CCNC: 12 U/L (ref 7–45)
ANION GAP SERPL CALC-SCNC: 14 MMOL/L (ref 10–20)
AST SERPL W P-5'-P-CCNC: 15 U/L (ref 9–39)
BILIRUB SERPL-MCNC: 0.6 MG/DL (ref 0–1.2)
BUN SERPL-MCNC: 17 MG/DL (ref 6–23)
CALCIUM SERPL-MCNC: 8.8 MG/DL (ref 8.6–10.6)
CHLORIDE SERPL-SCNC: 103 MMOL/L (ref 98–107)
CO2 SERPL-SCNC: 25 MMOL/L (ref 21–32)
CREAT SERPL-MCNC: 0.99 MG/DL (ref 0.5–1.05)
EGFRCR SERPLBLD CKD-EPI 2021: 57 ML/MIN/1.73M*2
ERYTHROCYTE [DISTWIDTH] IN BLOOD BY AUTOMATED COUNT: 14.8 % (ref 11.5–14.5)
GLUCOSE SERPL-MCNC: 87 MG/DL (ref 74–99)
HCT VFR BLD AUTO: 39.4 % (ref 36–46)
HGB BLD-MCNC: 13.2 G/DL (ref 12–16)
MCH RBC QN AUTO: 27 PG (ref 26–34)
MCHC RBC AUTO-ENTMCNC: 33.5 G/DL (ref 32–36)
MCV RBC AUTO: 81 FL (ref 80–100)
NRBC BLD-RTO: 0 /100 WBCS (ref 0–0)
PLATELET # BLD AUTO: 295 X10*3/UL (ref 150–450)
POTASSIUM SERPL-SCNC: 3.8 MMOL/L (ref 3.5–5.3)
PROT SERPL-MCNC: 6.3 G/DL (ref 6.4–8.2)
RBC # BLD AUTO: 4.88 X10*6/UL (ref 4–5.2)
SODIUM SERPL-SCNC: 138 MMOL/L (ref 136–145)
WBC # BLD AUTO: 10.6 X10*3/UL (ref 4.4–11.3)

## 2024-09-18 PROCEDURE — 83036 HEMOGLOBIN GLYCOSYLATED A1C: CPT

## 2024-09-18 PROCEDURE — 3078F DIAST BP <80 MM HG: CPT | Performed by: FAMILY MEDICINE

## 2024-09-18 PROCEDURE — G2211 COMPLEX E/M VISIT ADD ON: HCPCS | Performed by: FAMILY MEDICINE

## 2024-09-18 PROCEDURE — 80053 COMPREHEN METABOLIC PANEL: CPT

## 2024-09-18 PROCEDURE — 1123F ACP DISCUSS/DSCN MKR DOCD: CPT | Performed by: FAMILY MEDICINE

## 2024-09-18 PROCEDURE — 85027 COMPLETE CBC AUTOMATED: CPT

## 2024-09-18 PROCEDURE — 3074F SYST BP LT 130 MM HG: CPT | Performed by: FAMILY MEDICINE

## 2024-09-18 PROCEDURE — G0008 ADMIN INFLUENZA VIRUS VAC: HCPCS | Performed by: FAMILY MEDICINE

## 2024-09-18 PROCEDURE — 1036F TOBACCO NON-USER: CPT | Performed by: FAMILY MEDICINE

## 2024-09-18 PROCEDURE — 90662 IIV NO PRSV INCREASED AG IM: CPT | Performed by: FAMILY MEDICINE

## 2024-09-18 PROCEDURE — 99214 OFFICE O/P EST MOD 30 MIN: CPT | Performed by: FAMILY MEDICINE

## 2024-09-18 ASSESSMENT — ENCOUNTER SYMPTOMS
DEPRESSION: 0
OCCASIONAL FEELINGS OF UNSTEADINESS: 0
LOSS OF SENSATION IN FEET: 0

## 2024-09-18 NOTE — PROGRESS NOTES
"Subjective   Patient ID: Kalyani Polk is a 79 y.o. female who presents for SWELLING  (BOTH ANKLES ).    HPI   Noticing swelling in bilateral ankles. Ok in AM but progresses through day.  No pain or discomfort.  Improves with elevation and is much better today.  Does consume salts and is not moving as much as she should.  No new medications or supplements.  Denies any SOB or chest pain.  No cough.    Review of Systems  Negative unless noted in HPI    Objective   /61   Pulse 85   Temp 36.8 °C (98.3 °F)   Ht 1.6 m (5' 3\")   Wt 76 kg (167 lb 9.6 oz)   SpO2 95%   BMI 29.69 kg/m²     Physical Exam  Constitutional:       Appearance: Normal appearance.   Cardiovascular:      Rate and Rhythm: Normal rate and regular rhythm.   Pulmonary:      Effort: Pulmonary effort is normal.      Breath sounds: Normal breath sounds.   Musculoskeletal:      Right lower leg: Edema (limited to ankle, nonpitting) present.      Left lower leg: Edema present.   Skin:     Findings: No lesion or rash.   Neurological:      General: No focal deficit present.      Mental Status: She is alert and oriented to person, place, and time.   Psychiatric:         Mood and Affect: Mood normal.         Assessment/Plan   Problem List Items Addressed This Visit             ICD-10-CM    Bilateral lower extremity edema - Primary R60.0     We discussed some possibilities of dependent edema  Could be diet and activity related  Encourage elevation, decrease sodium, increase fluids and consider compression of able   Will check labs to make sure electrolyte, renal and hepatic function as well as glucose are stable  If all normal and persistent edema can consider decreasing Amlodipine to 5mg and adding additional agent to see if medication induced but less likely as she has been on Amlodipine 10mg for extensive time  Follow up if fails to improve or worsens         Relevant Orders    Comprehensive Metabolic Panel    Hemoglobin A1C    CBC     Other Visit " Diagnoses         Codes    Elevated fasting glucose     R73.01    Relevant Orders    Hemoglobin A1C    Need for influenza vaccination     Z23    Relevant Orders    Flu vaccine, trivalent, preservative free, HIGH-DOSE, age 65y+ (Fluzone) (Completed)

## 2024-09-18 NOTE — ASSESSMENT & PLAN NOTE
We discussed some possibilities of dependent edema  Could be diet and activity related  Encourage elevation, decrease sodium, increase fluids and consider compression of able   Will check labs to make sure electrolyte, renal and hepatic function as well as glucose are stable  If all normal and persistent edema can consider decreasing Amlodipine to 5mg and adding additional agent to see if medication induced but less likely as she has been on Amlodipine 10mg for extensive time  Follow up if fails to improve or worsens

## 2024-09-19 LAB
EST. AVERAGE GLUCOSE BLD GHB EST-MCNC: 131 MG/DL
HBA1C MFR BLD: 6.2 %

## 2024-09-26 ENCOUNTER — APPOINTMENT (OUTPATIENT)
Dept: PRIMARY CARE | Facility: CLINIC | Age: 79
End: 2024-09-26
Payer: MEDICARE

## 2024-09-27 DIAGNOSIS — K21.9 GASTROESOPHAGEAL REFLUX DISEASE WITHOUT ESOPHAGITIS: ICD-10-CM

## 2024-09-27 RX ORDER — PANTOPRAZOLE SODIUM 40 MG/1
40 TABLET, DELAYED RELEASE ORAL DAILY
Qty: 90 TABLET | Refills: 0 | Status: SHIPPED | OUTPATIENT
Start: 2024-09-27

## 2024-09-30 NOTE — PROGRESS NOTES
[facility]  Hand and Upper Extremity Service  Follow up visit         Follow up for: Right hand     Interval History: She returns for her right hand. She received a right carpal tunnel injection on last visit after being presumptively diagnosed with carpal tunnel based on her symptoms and physical examination findings. All treatment attempts including injections and bracing have been ineffective in resolving symptoms in any way. She has a history of chronic cervical radiculopathy dating prior to 2021 in the electronic medical record. She indicates that her neck is quite stiff and uncomfortable.               Past medical history, medications, allergies, surgical history and review of systems are reviewed and otherwise unchanged when compared to last visit on 11/14/23         Examination:  Constitutional: Oriented to person, place, and time.  Appears well-developed and well-nourished.  Head: Normocephalic and atraumatic.  Eyes: Pupils are equal, round, and reactive to light.  Cardiovascular: Intact distal pulses.  Pulmonary/Chest/Breast: Effort normal. No respiratory distress.  Neurological: Alert and oriented to person, place, and time.  Skin: Skin is warm and dry.  Psychiatric: normal mood and affect.  Behavior is normal.  Musculoskeletal: Right hand reveals normal appearance. No thenar atrophy. Full finger and thumb flexion without triggering. Diminished sensation and paresthesias median nerve distribution.       Personal Interpretation of Diagnostic studies: No new images obtained       Impression: Right hand numbness      Plan: Given the fact that injections and splinting hasn't helped and history of cervical radiculopathy, I think further workup may be warranted as symptoms may be related to her cervical spine. We have sent her for EMG testing and she'll call my office and I'll follow up with her over the phone to go over results and discuss treatment recommendations which  could include possible carpal tunnel decompression if that diagnosis is confirmed or otherwise a referral to spine service for evaluation of radiculopathy.       Follow up: Upon EMG completion              Marcos Diaz MD  [facility]  Department of Orthopaedic Surgery  Hand and Upper Extremity Reconstruction    Scribe Attestation  By signing my name below, I, Kristy Eloisa Mo   attest that this documentation has been prepared under the direction and in the presence of Dr. Marcos Diaz.    Dictation performed with the use of voice recognition software.  Syntax and grammatical errors may exist.

## 2024-10-01 ENCOUNTER — OFFICE VISIT (OUTPATIENT)
Dept: ORTHOPEDIC SURGERY | Facility: HOSPITAL | Age: 79
End: 2024-10-01
Payer: MEDICARE

## 2024-10-01 VITALS — HEIGHT: 63 IN | BODY MASS INDEX: 29.59 KG/M2 | WEIGHT: 167 LBS

## 2024-10-01 DIAGNOSIS — G56.01 CARPAL TUNNEL SYNDROME OF RIGHT WRIST: Primary | ICD-10-CM

## 2024-10-01 DIAGNOSIS — M79.641 RIGHT HAND PAIN: ICD-10-CM

## 2024-10-01 DIAGNOSIS — M54.12 CHRONIC CERVICAL RADICULOPATHY: ICD-10-CM

## 2024-10-01 PROCEDURE — 1123F ACP DISCUSS/DSCN MKR DOCD: CPT | Performed by: ORTHOPAEDIC SURGERY

## 2024-10-01 PROCEDURE — 1036F TOBACCO NON-USER: CPT | Performed by: ORTHOPAEDIC SURGERY

## 2024-10-01 PROCEDURE — 99213 OFFICE O/P EST LOW 20 MIN: CPT | Performed by: ORTHOPAEDIC SURGERY

## 2024-10-01 PROCEDURE — 1159F MED LIST DOCD IN RCRD: CPT | Performed by: ORTHOPAEDIC SURGERY

## 2024-10-01 ASSESSMENT — PAIN DESCRIPTION - DESCRIPTORS: DESCRIPTORS: ACHING;SORE

## 2024-10-01 ASSESSMENT — PAIN - FUNCTIONAL ASSESSMENT: PAIN_FUNCTIONAL_ASSESSMENT: 0-10

## 2024-10-01 ASSESSMENT — PAIN SCALES - GENERAL: PAINLEVEL_OUTOF10: 5 - MODERATE PAIN

## 2024-10-01 NOTE — LETTER
October 1, 2024     Marilyn Hernandez MD  [address]  [address]  [facility] [address]    Patient: Kalyani Polk   YOB: 1945   Date of Visit: 10/1/2024       Dear Dr. Marilyn Hernandez MD:    Thank you for referring Kalyani Polk to me for evaluation. Below are my notes for this consultation.  If you have questions, please do not hesitate to call me. I look forward to following your patient along with you.       Sincerely,     Marcos Diaz MD      CC: Miller Mcdermott PA-C  ______________________________________________________________________________________    [facility]  Hand and Upper Extremity Service  Follow up visit         Follow up for: Right hand     Interval History: She returns for her right hand. She received a right carpal tunnel injection on last visit after being presumptively diagnosed with carpal tunnel based on her symptoms and physical examination findings. All treatment attempts including injections and bracing have been ineffective in resolving symptoms in any way. She has a history of chronic cervical radiculopathy dating prior to 2021 in the electronic medical record. She indicates that her neck is quite stiff and uncomfortable.               Past medical history, medications, allergies, surgical history and review of systems are reviewed and otherwise unchanged when compared to last visit on 11/14/23         Examination:  Constitutional: Oriented to person, place, and time.  Appears well-developed and well-nourished.  Head: Normocephalic and atraumatic.  Eyes: Pupils are equal, round, and reactive to light.  Cardiovascular: Intact distal pulses.  Pulmonary/Chest/Breast: Effort normal. No respiratory distress.  Neurological: Alert and oriented to person, place, and time.  Skin: Skin is warm and dry.  Psychiatric: normal mood and affect.  Behavior is normal.  Musculoskeletal: Right hand reveals normal appearance. No thenar atrophy. Full finger and thumb  flexion without triggering. Diminished sensation and paresthesias median nerve distribution.       Personal Interpretation of Diagnostic studies: No new images obtained       Impression: Right hand numbness      Plan: Given the fact that injections and splinting hasn't helped and history of cervical radiculopathy, I think further workup may be warranted as symptoms may be related to her cervical spine. We have sent her for EMG testing and she'll call my office and I'll follow up with her over the phone to go over results and discuss treatment recommendations which could include possible carpal tunnel decompression if that diagnosis is confirmed or otherwise a referral to spine service for evaluation of radiculopathy.       Follow up: Upon EMG completion              Marcos Diaz MD  [facility]  Department of Orthopaedic Surgery  Hand and Upper Extremity Reconstruction    Scribe Attestation  By signing my name below, I, Kristy Mo , Scribe   attest that this documentation has been prepared under the direction and in the presence of Dr. Marcos Diaz.    Dictation performed with the use of voice recognition software.  Syntax and grammatical errors may exist.

## 2024-10-10 ENCOUNTER — APPOINTMENT (OUTPATIENT)
Dept: PRIMARY CARE | Facility: CLINIC | Age: 79
End: 2024-10-10
Payer: MEDICARE

## 2024-10-10 PROCEDURE — 90480 ADMN SARSCOV2 VAC 1/ONLY CMP: CPT | Performed by: FAMILY MEDICINE

## 2024-10-10 PROCEDURE — 91320 SARSCV2 VAC 30MCG TRS-SUC IM: CPT | Performed by: FAMILY MEDICINE

## 2024-10-16 ENCOUNTER — HOSPITAL ENCOUNTER (OUTPATIENT)
Dept: NEUROLOGY | Facility: CLINIC | Age: 79
Discharge: HOME | End: 2024-10-16
Payer: MEDICARE

## 2024-10-16 DIAGNOSIS — G56.01 CARPAL TUNNEL SYNDROME OF RIGHT WRIST: ICD-10-CM

## 2024-10-16 DIAGNOSIS — M54.12 CHRONIC CERVICAL RADICULOPATHY: ICD-10-CM

## 2024-10-16 PROCEDURE — 95910 NRV CNDJ TEST 7-8 STUDIES: CPT | Performed by: PSYCHIATRY & NEUROLOGY

## 2024-10-16 PROCEDURE — 95886 MUSC TEST DONE W/N TEST COMP: CPT | Performed by: PSYCHIATRY & NEUROLOGY

## 2024-10-16 NOTE — PROCEDURES
EMG & nerve conduction    Date/Time: 10/16/2024 9:47 AM    Performed by: Balwinder Paul MD  Authorized by: Marcos Diaz MD    Consent:     Consent obtained:  Written    Consent given by:  Patient  Universal protocol:     Procedure explained and questions answered to patient or proxy's satisfaction: yes      Patient identity confirmed:  Verbally with patient and provided demographic data  Indications:     Indications:  Right hand numbness, tingling and pain, evaluate for median neuropathy at the wrist.  Sedation:     Sedation type:  None  Anesthesia:     Anesthesia method:  None  Procedure specific details:      This is an abnormal study.    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    There is no electrophysiologic evidence of right cervical radiculopathy.    Balwinder Paul MD    Post-procedure details:     Procedure completion:  Tolerated well, no immediate complications

## 2024-10-30 ENCOUNTER — OFFICE VISIT (OUTPATIENT)
Dept: PRIMARY CARE | Facility: CLINIC | Age: 79
End: 2024-10-30
Payer: MEDICARE

## 2024-10-30 VITALS
DIASTOLIC BLOOD PRESSURE: 70 MMHG | HEART RATE: 98 BPM | WEIGHT: 163 LBS | OXYGEN SATURATION: 97 % | HEIGHT: 63 IN | TEMPERATURE: 97.9 F | BODY MASS INDEX: 28.88 KG/M2 | SYSTOLIC BLOOD PRESSURE: 106 MMHG

## 2024-10-30 DIAGNOSIS — R20.2 PARESTHESIAS: Primary | ICD-10-CM

## 2024-10-30 LAB
TSH SERPL-ACNC: 0.84 MIU/L (ref 0.44–3.98)
VIT B12 SERPL-MCNC: 603 PG/ML (ref 211–911)

## 2024-10-30 PROCEDURE — 99213 OFFICE O/P EST LOW 20 MIN: CPT | Performed by: FAMILY MEDICINE

## 2024-10-30 PROCEDURE — 84443 ASSAY THYROID STIM HORMONE: CPT

## 2024-10-30 PROCEDURE — 1123F ACP DISCUSS/DSCN MKR DOCD: CPT | Performed by: FAMILY MEDICINE

## 2024-10-30 PROCEDURE — 1160F RVW MEDS BY RX/DR IN RCRD: CPT | Performed by: FAMILY MEDICINE

## 2024-10-30 PROCEDURE — 82607 VITAMIN B-12: CPT

## 2024-10-30 PROCEDURE — 3078F DIAST BP <80 MM HG: CPT | Performed by: FAMILY MEDICINE

## 2024-10-30 PROCEDURE — 1159F MED LIST DOCD IN RCRD: CPT | Performed by: FAMILY MEDICINE

## 2024-10-30 PROCEDURE — 3074F SYST BP LT 130 MM HG: CPT | Performed by: FAMILY MEDICINE

## 2024-10-30 PROCEDURE — 84207 ASSAY OF VITAMIN B-6: CPT

## 2024-10-30 ASSESSMENT — ENCOUNTER SYMPTOMS
LOSS OF SENSATION IN FEET: 0
OCCASIONAL FEELINGS OF UNSTEADINESS: 0
DEPRESSION: 0

## 2024-11-04 LAB — PYRIDOXAL PHOS SERPL-SCNC: 34.5 NMOL/L (ref 20–125)

## 2024-11-07 NOTE — PROGRESS NOTES
[facility]  Hand and Upper Extremity Service  Follow up visit         Follow up for: Right wrist     Interval History: She returns for her right wrist. She received a right carpal tunnel injection on 11/24/23 and she reports that it didn't result in any improvement of symptoms. She had numbness and tingling and was previously diagnosed with carpal tunnel syndrome based of those symptoms and physical examination findings. She has completed EMG testing and her symptoms are unchanged.               Past medical history, medications, allergies, surgical history and review of systems are reviewed and otherwise unchanged when compared to last visit on 10/1/24         Examination:  Constitutional: Oriented to person, place, and time.  Appears well-developed and well-nourished.  Head: Normocephalic and atraumatic.  Eyes: Pupils are equal, round, and reactive to light.  Cardiovascular: Intact distal pulses.  Pulmonary/Chest/Breast: Effort normal. No respiratory distress.  Neurological: Alert and oriented to person, place, and time.  Skin: Skin is warm and dry.  Psychiatric: normal mood and affect.  Behavior is normal.  Musculoskeletal: Right wrist reveals normal appearance. No thenar atrophy. Excellent digital range of motion without triggering. Diminished sensation median nerve distribution. Hypersensitivity in median nerve distribution.       Personal Interpretation of Diagnostic studies: Review of EMG study taken 10/16 reveals moderately severe carpal tunnel syndrome with more significant sensory abnormalities than motor abnormalities.        Impression: Right carpal tunnel syndrome       Plan: I've recommended surgery as she has failed other conservatory methods including injections. We discussed the benefits of surgery and return to activities. She's eager to schedule so she'll call and schedule right carpal tunnel release under iv sedation in the near future.       Follow up: Will  call to schedule surgery     We discussed risks, benefits, alternatives and anticipated post op course including time away from work and activities following surgical treatment for the patient's condition. This is major surgery with identifiable risks. No guarantees for success have been provided. The patient has expressed understanding and has elected to pursue operative treatment.        For Surgical Planning:  Diagnosis: Right carpal tunnel syndrome  Procedure: Right carpal tunnel  CPT: 18854  Anesthesia: MAC  Duration: 25 minutes  Special Equipment Needed: None  Medical Notes / PM / DM / PAT needed?:  PAT if surgery at [facility] [place] [place]  Location: [facility] or [place]  Initial Post Operative Visit: 2 weeks            Marcos Diaz MD  [facility]  Department of Orthopaedic Surgery  Hand and Upper Extremity Reconstruction    Scribe Attestation  By signing my name below, I, Kristy Mo , Scribjagjit   attest that this documentation has been prepared under the direction and in the presence of Dr. Marcos Diaz.    Dictation performed with the use of voice recognition software.  Syntax and grammatical errors may exist.

## 2024-11-07 NOTE — H&P (VIEW-ONLY)
[facility]  Hand and Upper Extremity Service  Follow up visit         Follow up for: Right wrist     Interval History: She returns for her right wrist. She received a right carpal tunnel injection on 11/24/23 and she reports that it didn't result in any improvement of symptoms. She had numbness and tingling and was previously diagnosed with carpal tunnel syndrome based of those symptoms and physical examination findings. She has completed EMG testing and her symptoms are unchanged.               Past medical history, medications, allergies, surgical history and review of systems are reviewed and otherwise unchanged when compared to last visit on 10/1/24         Examination:  Constitutional: Oriented to person, place, and time.  Appears well-developed and well-nourished.  Head: Normocephalic and atraumatic.  Eyes: Pupils are equal, round, and reactive to light.  Cardiovascular: Intact distal pulses.  Pulmonary/Chest/Breast: Effort normal. No respiratory distress.  Neurological: Alert and oriented to person, place, and time.  Skin: Skin is warm and dry.  Psychiatric: normal mood and affect.  Behavior is normal.  Musculoskeletal: Right wrist reveals normal appearance. No thenar atrophy. Excellent digital range of motion without triggering. Diminished sensation median nerve distribution. Hypersensitivity in median nerve distribution.       Personal Interpretation of Diagnostic studies: Review of EMG study taken 10/16 reveals moderately severe carpal tunnel syndrome with more significant sensory abnormalities than motor abnormalities.        Impression: Right carpal tunnel syndrome       Plan: I've recommended surgery as she has failed other conservatory methods including injections. We discussed the benefits of surgery and return to activities. She's eager to schedule so she'll call and schedule right carpal tunnel release under iv sedation in the near future.       Follow up: Will  call to schedule surgery     We discussed risks, benefits, alternatives and anticipated post op course including time away from work and activities following surgical treatment for the patient's condition. This is major surgery with identifiable risks. No guarantees for success have been provided. The patient has expressed understanding and has elected to pursue operative treatment.        For Surgical Planning:  Diagnosis: Right carpal tunnel syndrome  Procedure: Right carpal tunnel  CPT: 04197  Anesthesia: MAC  Duration: 25 minutes  Special Equipment Needed: None  Medical Notes / PM / DM / PAT needed?:  PAT if surgery at [facility] [place]  Location: [facility] or [place]  Initial Post Operative Visit: 2 weeks            Marcos Diaz MD  [facility]  Department of Orthopaedic Surgery  Hand and Upper Extremity Reconstruction    Scribe Attestation  By signing my name below, I, Kristy Mo , Scribjagjit   attest that this documentation has been prepared under the direction and in the presence of Dr. Marcos Diaz.    Dictation performed with the use of voice recognition software.  Syntax and grammatical errors may exist.

## 2024-11-12 ENCOUNTER — OFFICE VISIT (OUTPATIENT)
Dept: ORTHOPEDIC SURGERY | Facility: HOSPITAL | Age: 79
End: 2024-11-12
Payer: MEDICARE

## 2024-11-12 VITALS — HEIGHT: 63 IN | BODY MASS INDEX: 28.88 KG/M2 | WEIGHT: 163 LBS

## 2024-11-12 DIAGNOSIS — G56.01 CARPAL TUNNEL SYNDROME ON RIGHT: Primary | ICD-10-CM

## 2024-11-12 PROCEDURE — 99214 OFFICE O/P EST MOD 30 MIN: CPT | Performed by: ORTHOPAEDIC SURGERY

## 2024-11-12 PROCEDURE — 1159F MED LIST DOCD IN RCRD: CPT | Performed by: ORTHOPAEDIC SURGERY

## 2024-11-12 PROCEDURE — 1036F TOBACCO NON-USER: CPT | Performed by: ORTHOPAEDIC SURGERY

## 2024-11-12 PROCEDURE — 1123F ACP DISCUSS/DSCN MKR DOCD: CPT | Performed by: ORTHOPAEDIC SURGERY

## 2024-11-12 PROCEDURE — 99417 PROLNG OP E/M EACH 15 MIN: CPT | Performed by: ORTHOPAEDIC SURGERY

## 2024-11-12 ASSESSMENT — PAIN - FUNCTIONAL ASSESSMENT: PAIN_FUNCTIONAL_ASSESSMENT: 0-10

## 2024-11-12 ASSESSMENT — PAIN DESCRIPTION - DESCRIPTORS: DESCRIPTORS: ACHING;SORE

## 2024-11-12 ASSESSMENT — PAIN SCALES - GENERAL: PAINLEVEL_OUTOF10: 5 - MODERATE PAIN

## 2024-11-14 ENCOUNTER — TELEPHONE (OUTPATIENT)
Dept: ORTHOPEDIC SURGERY | Facility: CLINIC | Age: 79
End: 2024-11-14

## 2024-11-14 NOTE — TELEPHONE ENCOUNTER
Copied from CRM #[id number]. Topic: Transfer to Department for Scheduling  >> Nov 14, 2024 10:28 AM Jennifer ELY wrote:  Patient is requesting to be contacted in regards to scheduling a surgery with the provider. Reached out to the office but was unable to reach anyone. Please reach out to the patient at earliest convenience. Best # to reach is [phone]. Thank you

## 2024-11-14 NOTE — TELEPHONE ENCOUNTER
Copied from CRM #[id number]. Topic: Transfer to Department for Scheduling  >> Nov 14, 2024 10:28 AM Jennifer ELY wrote:  Patient is requesting to be contacted in regards to scheduling a surgery with the provider. Reached out to the office but was unable to reach anyone. Please reach out to the patient at earliest convenience. Best # to reach is [phone]. Thank you

## 2024-11-14 NOTE — TELEPHONE ENCOUNTER
Copied from CRM #[id number]. Topic: Transfer to Department for Scheduling  >> Nov 14, 2024 10:28 AM Jennifer ELY wrote:  Patient is requesting to be contacted in regards to scheduling a surgery with the provider. Reached out to the office but was unable to reach anyone. Please reach out to the patient at earliest convenience. Best # to reach is [phone]. Thank you

## 2024-11-14 NOTE — TELEPHONE ENCOUNTER
Copied from CRM #[id number]. Topic: Transfer to Department for Scheduling  >> Nov 14, 2024 10:28 AM Jennifer ELY wrote:  Patient is requesting to be contacted in regards to scheduling a surgery with the provider. Reached out to the office but was unable to reach anyone. Please reach out to the patient at earliest convenience. Best # to reach is [phone]. Thank you

## 2024-11-19 ENCOUNTER — HOSPITAL ENCOUNTER (OUTPATIENT)
Dept: RADIOLOGY | Facility: CLINIC | Age: 79
Discharge: HOME | End: 2024-11-19
Payer: MEDICARE

## 2024-11-19 ENCOUNTER — OFFICE VISIT (OUTPATIENT)
Dept: PRIMARY CARE | Facility: CLINIC | Age: 79
End: 2024-11-19
Payer: MEDICARE

## 2024-11-19 VITALS
DIASTOLIC BLOOD PRESSURE: 76 MMHG | HEART RATE: 106 BPM | BODY MASS INDEX: 28.99 KG/M2 | WEIGHT: 163.6 LBS | HEIGHT: 63 IN | SYSTOLIC BLOOD PRESSURE: 116 MMHG | TEMPERATURE: 97.5 F | OXYGEN SATURATION: 97 %

## 2024-11-19 DIAGNOSIS — M25.561 CHRONIC PAIN OF RIGHT KNEE: ICD-10-CM

## 2024-11-19 DIAGNOSIS — G89.29 CHRONIC PAIN OF LEFT KNEE: ICD-10-CM

## 2024-11-19 DIAGNOSIS — R20.2 PARESTHESIAS: ICD-10-CM

## 2024-11-19 DIAGNOSIS — G89.29 CHRONIC PAIN OF RIGHT KNEE: Primary | ICD-10-CM

## 2024-11-19 DIAGNOSIS — G89.29 CHRONIC PAIN OF RIGHT KNEE: ICD-10-CM

## 2024-11-19 DIAGNOSIS — M25.562 CHRONIC PAIN OF LEFT KNEE: ICD-10-CM

## 2024-11-19 DIAGNOSIS — M25.561 CHRONIC PAIN OF RIGHT KNEE: Primary | ICD-10-CM

## 2024-11-19 PROCEDURE — 3074F SYST BP LT 130 MM HG: CPT | Performed by: FAMILY MEDICINE

## 2024-11-19 PROCEDURE — 1123F ACP DISCUSS/DSCN MKR DOCD: CPT | Performed by: FAMILY MEDICINE

## 2024-11-19 PROCEDURE — 73562 X-RAY EXAM OF KNEE 3: CPT | Mod: RIGHT SIDE | Performed by: RADIOLOGY

## 2024-11-19 PROCEDURE — 1125F AMNT PAIN NOTED PAIN PRSNT: CPT | Performed by: FAMILY MEDICINE

## 2024-11-19 PROCEDURE — 1160F RVW MEDS BY RX/DR IN RCRD: CPT | Performed by: FAMILY MEDICINE

## 2024-11-19 PROCEDURE — 3078F DIAST BP <80 MM HG: CPT | Performed by: FAMILY MEDICINE

## 2024-11-19 PROCEDURE — 73562 X-RAY EXAM OF KNEE 3: CPT | Mod: LEFT SIDE | Performed by: RADIOLOGY

## 2024-11-19 PROCEDURE — 73562 X-RAY EXAM OF KNEE 3: CPT | Mod: LT

## 2024-11-19 PROCEDURE — 1159F MED LIST DOCD IN RCRD: CPT | Performed by: FAMILY MEDICINE

## 2024-11-19 PROCEDURE — 99213 OFFICE O/P EST LOW 20 MIN: CPT | Performed by: FAMILY MEDICINE

## 2024-11-19 PROCEDURE — 73562 X-RAY EXAM OF KNEE 3: CPT | Mod: RT

## 2024-11-19 RX ORDER — GABAPENTIN 100 MG/1
CAPSULE ORAL
Qty: 21 CAPSULE | Refills: 0 | Status: SHIPPED | OUTPATIENT
Start: 2024-11-19

## 2024-11-19 ASSESSMENT — PATIENT HEALTH QUESTIONNAIRE - PHQ9
2. FEELING DOWN, DEPRESSED OR HOPELESS: NOT AT ALL
SUM OF ALL RESPONSES TO PHQ9 QUESTIONS 1 AND 2: 0
1. LITTLE INTEREST OR PLEASURE IN DOING THINGS: NOT AT ALL

## 2024-11-19 ASSESSMENT — PAIN SCALES - GENERAL: PAINLEVEL_OUTOF10: 5

## 2024-11-19 ASSESSMENT — ENCOUNTER SYMPTOMS
OCCASIONAL FEELINGS OF UNSTEADINESS: 0
LOSS OF SENSATION IN FEET: 0
DEPRESSION: 0

## 2024-11-19 NOTE — PROGRESS NOTES
"Subjective   Patient ID: Kalyani Polk is a 79 y.o. female who presents for Foot Swelling (Since 9/18 off and on).  Patient presents with a couple concerns.  She has had right knee pain and cracking.  Is off and on for a few weeks.  Had been aching off and on for a while before that.  Left hurts off and on, but hasn't been as bad, and not cracking.    Left ankle and foot swelling off and on for a while.  More frequent recently.  Always down in AM  Compression helps.  No pain.    Saw neuro CNP at [facility] regarding the odd sensations she was having.  Thought to be coming from her back.    Rx given for gabapentin and plan for additional testing if no relief.  She sent rx for gabapentin liquid, but is too costly.  Patient called to have capsules called in, but has not heard back yet.        Review of Systems    Objective   /76 (BP Location: Left arm, Patient Position: Sitting, BP Cuff Size: Large adult)   Pulse 106   Temp 36.4 °C (97.5 °F) (Temporal)   Ht 1.6 m (5' 3\")   Wt 74.2 kg (163 lb 9.6 oz)   SpO2 97%   BMI 28.98 kg/m²     Physical Exam  Constitutional:       Appearance: Normal appearance.   Musculoskeletal:      Right knee: Effusion (small) and crepitus present. Decreased range of motion (and pain with full extension).      Left knee: No swelling or crepitus. Normal range of motion. No tenderness.      Left foot: Normal range of motion and normal capillary refill. No swelling or tenderness. Normal pulse.   Neurological:      Mental Status: She is alert.           Assessment/Plan   Problem List Items Addressed This Visit    None  Visit Diagnoses       Chronic pain of right knee    -  Primary    Relevant Orders    XR knee right 3 views (Completed)    Paresthesias        Relevant Medications    gabapentin (Neurontin) 100 mg capsule    Chronic pain of left knee        Relevant Orders    XR knee left 3 views (Completed)               "

## 2024-11-21 NOTE — PATIENT INSTRUCTIONS
Pain in both knees, worse in right, and worse recently.  Check X-Rays both sides.  May benefit from seeing ortho vs PT, based on results.    Reviewed neuro note.  Had been given liquid gabapentin to start at low dose, and gradually increase to help with the paresthesias.  Rx sent to pharmacy for gabapentin 100 mg capsules.  Start with 1 pill in evening for 7 days, then 2 pills in evening for 7 days.  After that it will be increased to 600 mg at night, which could be done with different mg of capsule.      For foot pain and swelling, continue compression.  Nothing worrisome noted on exam.  Could see podiatrist if persists.

## 2024-11-28 DIAGNOSIS — M25.562 CHRONIC PAIN OF BOTH KNEES: Primary | ICD-10-CM

## 2024-11-28 DIAGNOSIS — G89.29 CHRONIC PAIN OF BOTH KNEES: Primary | ICD-10-CM

## 2024-11-28 DIAGNOSIS — M25.561 CHRONIC PAIN OF BOTH KNEES: Primary | ICD-10-CM

## 2024-12-02 DIAGNOSIS — G56.01 CARPAL TUNNEL SYNDROME ON RIGHT: ICD-10-CM

## 2024-12-03 ENCOUNTER — ANESTHESIA EVENT (OUTPATIENT)
Dept: OPERATING ROOM | Facility: CLINIC | Age: 79
End: 2024-12-03
Payer: MEDICARE

## 2024-12-06 ENCOUNTER — ANESTHESIA (OUTPATIENT)
Dept: OPERATING ROOM | Facility: CLINIC | Age: 79
End: 2024-12-06
Payer: MEDICARE

## 2024-12-06 ENCOUNTER — HOSPITAL ENCOUNTER (OUTPATIENT)
Facility: CLINIC | Age: 79
Setting detail: OUTPATIENT SURGERY
Discharge: HOME | End: 2024-12-06
Attending: ORTHOPAEDIC SURGERY | Admitting: ORTHOPAEDIC SURGERY
Payer: MEDICARE

## 2024-12-06 VITALS
WEIGHT: 165.34 LBS | HEART RATE: 86 BPM | BODY MASS INDEX: 29.3 KG/M2 | DIASTOLIC BLOOD PRESSURE: 56 MMHG | HEIGHT: 63 IN | SYSTOLIC BLOOD PRESSURE: 104 MMHG | OXYGEN SATURATION: 97 % | RESPIRATION RATE: 16 BRPM | TEMPERATURE: 96.8 F

## 2024-12-06 DIAGNOSIS — G56.01 CARPAL TUNNEL SYNDROME ON RIGHT: Primary | ICD-10-CM

## 2024-12-06 PROCEDURE — 2500000004 HC RX 250 GENERAL PHARMACY W/ HCPCS (ALT 636 FOR OP/ED): Mod: JG | Performed by: ORTHOPAEDIC SURGERY

## 2024-12-06 PROCEDURE — 3700000002 HC GENERAL ANESTHESIA TIME - EACH INCREMENTAL 1 MINUTE: Performed by: ORTHOPAEDIC SURGERY

## 2024-12-06 PROCEDURE — 7100000009 HC PHASE TWO TIME - INITIAL BASE CHARGE: Performed by: ORTHOPAEDIC SURGERY

## 2024-12-06 PROCEDURE — 2500000005 HC RX 250 GENERAL PHARMACY W/O HCPCS: Performed by: ORTHOPAEDIC SURGERY

## 2024-12-06 PROCEDURE — 3600000003 HC OR TIME - INITIAL BASE CHARGE - PROCEDURE LEVEL THREE: Performed by: ORTHOPAEDIC SURGERY

## 2024-12-06 PROCEDURE — 2500000004 HC RX 250 GENERAL PHARMACY W/ HCPCS (ALT 636 FOR OP/ED): Performed by: ANESTHESIOLOGIST ASSISTANT

## 2024-12-06 PROCEDURE — 3600000008 HC OR TIME - EACH INCREMENTAL 1 MINUTE - PROCEDURE LEVEL THREE: Performed by: ORTHOPAEDIC SURGERY

## 2024-12-06 PROCEDURE — 7100000010 HC PHASE TWO TIME - EACH INCREMENTAL 1 MINUTE: Performed by: ORTHOPAEDIC SURGERY

## 2024-12-06 PROCEDURE — 64721 CARPAL TUNNEL SURGERY: CPT | Performed by: ORTHOPAEDIC SURGERY

## 2024-12-06 PROCEDURE — 3700000001 HC GENERAL ANESTHESIA TIME - INITIAL BASE CHARGE: Performed by: ORTHOPAEDIC SURGERY

## 2024-12-06 RX ORDER — SODIUM CHLORIDE 0.9 G/100ML
IRRIGANT IRRIGATION AS NEEDED
Status: DISCONTINUED | OUTPATIENT
Start: 2024-12-06 | End: 2024-12-06 | Stop reason: HOSPADM

## 2024-12-06 RX ORDER — BUPIVACAINE HYDROCHLORIDE 5 MG/ML
INJECTION, SOLUTION EPIDURAL; INTRACAUDAL AS NEEDED
Status: DISCONTINUED | OUTPATIENT
Start: 2024-12-06 | End: 2024-12-06 | Stop reason: HOSPADM

## 2024-12-06 RX ORDER — LABETALOL HYDROCHLORIDE 5 MG/ML
5 INJECTION, SOLUTION INTRAVENOUS ONCE AS NEEDED
Status: DISCONTINUED | OUTPATIENT
Start: 2024-12-06 | End: 2024-12-06 | Stop reason: HOSPADM

## 2024-12-06 RX ORDER — LIDOCAINE HYDROCHLORIDE 10 MG/ML
0.1 INJECTION, SOLUTION EPIDURAL; INFILTRATION; INTRACAUDAL; PERINEURAL ONCE
Status: DISCONTINUED | OUTPATIENT
Start: 2024-12-06 | End: 2024-12-06 | Stop reason: HOSPADM

## 2024-12-06 RX ORDER — ACETAMINOPHEN 325 MG/1
650 TABLET ORAL EVERY 4 HOURS PRN
Status: DISCONTINUED | OUTPATIENT
Start: 2024-12-06 | End: 2024-12-06 | Stop reason: HOSPADM

## 2024-12-06 RX ORDER — ONDANSETRON HYDROCHLORIDE 2 MG/ML
INJECTION, SOLUTION INTRAVENOUS AS NEEDED
Status: DISCONTINUED | OUTPATIENT
Start: 2024-12-06 | End: 2024-12-06

## 2024-12-06 RX ORDER — METOCLOPRAMIDE HYDROCHLORIDE 5 MG/ML
10 INJECTION INTRAMUSCULAR; INTRAVENOUS ONCE AS NEEDED
Status: DISCONTINUED | OUTPATIENT
Start: 2024-12-06 | End: 2024-12-06 | Stop reason: HOSPADM

## 2024-12-06 RX ORDER — CEFAZOLIN 1 G/1
INJECTION, POWDER, FOR SOLUTION INTRAVENOUS AS NEEDED
Status: DISCONTINUED | OUTPATIENT
Start: 2024-12-06 | End: 2024-12-06

## 2024-12-06 RX ORDER — MIDAZOLAM HYDROCHLORIDE 1 MG/ML
INJECTION, SOLUTION INTRAMUSCULAR; INTRAVENOUS AS NEEDED
Status: DISCONTINUED | OUTPATIENT
Start: 2024-12-06 | End: 2024-12-06

## 2024-12-06 RX ORDER — ONDANSETRON HYDROCHLORIDE 2 MG/ML
4 INJECTION, SOLUTION INTRAVENOUS ONCE AS NEEDED
Status: DISCONTINUED | OUTPATIENT
Start: 2024-12-06 | End: 2024-12-06 | Stop reason: HOSPADM

## 2024-12-06 RX ORDER — PROPOFOL 10 MG/ML
INJECTION, EMULSION INTRAVENOUS CONTINUOUS PRN
Status: DISCONTINUED | OUTPATIENT
Start: 2024-12-06 | End: 2024-12-06

## 2024-12-06 RX ORDER — ALBUTEROL SULFATE 0.83 MG/ML
2.5 SOLUTION RESPIRATORY (INHALATION) ONCE AS NEEDED
Status: DISCONTINUED | OUTPATIENT
Start: 2024-12-06 | End: 2024-12-06 | Stop reason: HOSPADM

## 2024-12-06 RX ORDER — FENTANYL CITRATE 50 UG/ML
50 INJECTION, SOLUTION INTRAMUSCULAR; INTRAVENOUS EVERY 5 MIN PRN
Status: DISCONTINUED | OUTPATIENT
Start: 2024-12-06 | End: 2024-12-06 | Stop reason: HOSPADM

## 2024-12-06 RX ORDER — FENTANYL CITRATE 50 UG/ML
INJECTION, SOLUTION INTRAMUSCULAR; INTRAVENOUS AS NEEDED
Status: DISCONTINUED | OUTPATIENT
Start: 2024-12-06 | End: 2024-12-06

## 2024-12-06 RX ORDER — HYDROCODONE BITARTRATE AND ACETAMINOPHEN 5; 325 MG/1; MG/1
1 TABLET ORAL EVERY 6 HOURS PRN
Qty: 12 TABLET | Refills: 0 | Status: SHIPPED | OUTPATIENT
Start: 2024-12-06 | End: 2024-12-09

## 2024-12-06 RX ORDER — FENTANYL CITRATE 50 UG/ML
25 INJECTION, SOLUTION INTRAMUSCULAR; INTRAVENOUS EVERY 5 MIN PRN
Status: DISCONTINUED | OUTPATIENT
Start: 2024-12-06 | End: 2024-12-06 | Stop reason: HOSPADM

## 2024-12-06 SDOH — HEALTH STABILITY: MENTAL HEALTH: CURRENT SMOKER: 0

## 2024-12-06 ASSESSMENT — PAIN SCALES - GENERAL
PAIN_LEVEL: 0
PAINLEVEL_OUTOF10: 0 - NO PAIN
PAINLEVEL_OUTOF10: 0 - NO PAIN

## 2024-12-06 ASSESSMENT — COLUMBIA-SUICIDE SEVERITY RATING SCALE - C-SSRS
6. HAVE YOU EVER DONE ANYTHING, STARTED TO DO ANYTHING, OR PREPARED TO DO ANYTHING TO END YOUR LIFE?: NO
2. HAVE YOU ACTUALLY HAD ANY THOUGHTS OF KILLING YOURSELF?: NO
1. IN THE PAST MONTH, HAVE YOU WISHED YOU WERE DEAD OR WISHED YOU COULD GO TO SLEEP AND NOT WAKE UP?: NO

## 2024-12-06 ASSESSMENT — PAIN - FUNCTIONAL ASSESSMENT
PAIN_FUNCTIONAL_ASSESSMENT: 0-10
PAIN_FUNCTIONAL_ASSESSMENT: UNABLE TO SELF-REPORT
PAIN_FUNCTIONAL_ASSESSMENT: 0-10

## 2024-12-06 NOTE — ANESTHESIA POSTPROCEDURE EVALUATION
Patient: Kalyani Way    Procedure Summary       Date: 12/06/24 [place] / [place] [facility] [place] [facility] [place] [facility] [place] [facility] [place]    Anesthesia Start: 1052 Anesthesia Stop: 1127    Procedure: Right Carpal Tunnel Release / 25 Minutes (Right: Hand) Diagnosis:       Carpal tunnel syndrome on right      (Carpal tunnel syndrome on right [G56.01])    Surgeons: Marcos Diaz MD Responsible Provider: Filemon Cox MD    Anesthesia Type: MAC ASA Status: 2            Anesthesia Type: MAC    Vitals Value Taken Time   BP 91/58 12/06/24 1125   Temp 36 °C (96.8 °F) 12/06/24 1125   Pulse 80 12/06/24 1125   Resp 16 12/06/24 1125   SpO2 95 % 12/06/24 1125       Anesthesia Post Evaluation    Patient location during evaluation: PACU  Patient participation: complete - patient cannot participate  Level of consciousness: awake  Pain score: 0  Pain management: adequate  Airway patency: patent  Cardiovascular status: acceptable  Respiratory status: acceptable  Hydration status: acceptable  Postoperative Nausea and Vomiting: none        No notable events documented.

## 2024-12-06 NOTE — DISCHARGE INSTRUCTIONS
Post-Operative Instructions  Dr. Marcos Diaz [phone]    Dressing:  You have a bandage or splint covering your operative site.    You may remove the dressing in 4  days following surgery. Once your dressing is removed you may shower and/or wash your incision in a sink with soap and water. Do not soak your incision. No bath tubs, hot tubs or swimming pools. After washing the wound please pat the incision dry thoroughly. Please use mild soap. Please do not use hydrogen peroxide. Please cover the wound with a band-aid or gauze and change it daily. Please do not apply any salves, creams, lotions or ointments to the surgical incision. Otherwise keep incision clean and dry (no yard work, engine work, etc.)    Post Anesthesia Instructions:  If you received general anesthesia or IV sedation for your procedure for the next 24 hours: No driving, no drinking alcohol, no sleeping aids, no important decision making, and have an adult with you for 24 hours.    Showering:  You may shower following surgery but please adhere to above instructions regarding the dressing. If showering with bandage/splint in place please ensure that it is kept dry and covered while bathing. There are commercially available cast bags that can be used to protect your dressing while showering. If using garbage bags please make sure that there are no holes in the bag and that the bag has been sealed above the dressing. If the bandage gets wet you must contact your surgeon's office to make arrangements to be seen to have the bandage changed.     Ice/Elevation:  The application of ice to your surgical site after surgery will help with pain control and swelling. This can be very effective for 48-72 hours after surgery. Please be careful to avoid getting bandage wet from a leaky ice bag. Please be advised that the ice may need to be applied for longer periods of time for the cooling effect to penetrate the post-operative dressing.     Elevation of the  operative site above the level of the heart as much as possible for the first 48-72 hours after surgery. Use your sling if you have been provided one while standing or walking. If your fingers are not included in the dressing you are encouraged to attempt finger range of motion as this will help with your hand swelling and ultimate recovery.    Pain Medication:  If you received a regional anesthetic on the day of your surgery your arm and hand may be numb for up to 24 hours after surgery. It is important to wear your sling while the block is still effective in order to protect your arm. It is advisable to take pain medications prior to going to sleep in case the regional anesthesia medication wears off while you are sleeping.    Take your pain medications as directed. Narcotic pain medications can cause lethargy, nausea and constipation. Please contact your surgeon's office and discontinue the medication if these symptoms become problematic. Eating a regular diet, drinking fluids and walking can help with constipation from these medications. Avoid alcohol consumption and driving while taking narcotic pain medications.     Additional pain control options:     You are encouraged to take over the counter medications like Advil / Motrin / Ibuprofen / Aleve in addition to your prescribed pain medications after surgery.    Smoking/Tobacco:  Tobacco use is known to interfere with wound and fracture healing and increase post-operative pain. It can also increase your risk of poor outcomes following surgery. Please do not use tobacco or nicotine products following your surgery. This includes smokeless tobacco, or nicotine replacement products (patches, gum, etc.).    Driving:  It is not advisable to operate a vehicle while using narcotic pain medications. Additionally, please be advised that you are likely to have challenges operating a car or motorcycle while you are still in your postoperative dressing and this could  increase your risk of being involved in an accident and being cited for driving while physically impaired.     Warning Signs:  Observe your arm/hand and incision site (if visible) for increased redness, inflammation, drainage, odor or pain that is unrelieved by rest, elevation or medication. Please contact your surgeon's office immediately if you develop any of these issues or if you develop a fever greater than 101°.    Follow Up Appointments:  Your post-operative appointment has been scheduled for 12/24/2024 at 11:30 am    [facility], [address] [address], [place], [place], [address]

## 2024-12-06 NOTE — OP NOTE
Right Carpal Tunnel Release / 25 Minutes (R) Operative Note     Date: 2024  OR Location: [facility] OR    Name: Kalyani Polk : 1945, Age: 79 y.o., MRN: [id number], Sex: female    Diagnosis  Pre-op Diagnosis      * Carpal tunnel syndrome on right [G56.01] Post-op Diagnosis     * Carpal tunnel syndrome on right [G56.01]     Procedures  Right Carpal Tunnel Release / 25 Minutes  33967 - IN NEUROPLASTY &/TRANSPOS MEDIAN NRV CARPAL TUNNE      Surgeons      * Marcos Diaz - Primary    Resident/Fellow/Other Assistant:  Surgeons and Role:  * No surgeons found with a matching role *    Staff:   Circulator: Hillary Salinas Person: Shan    Anesthesia Staff: Anesthesiologist: Filemon Cox MD  C-AA: FRANCESCO Sheikh    Procedure Summary  Anesthesia: Monitor Anesthesia Care  ASA: II  Estimated Blood Loss: 0 mL  Intra-op Medications: Administrations occurring from 1130 to 1215 on 24:  * No intraprocedure medications in log *           Anesthesia Record               Intraprocedure I/O Totals          Intake    Propofol Drip 0.00 mL    The total shown is the total volume documented since Anesthesia Start was filed.    Total Intake 0 mL          Specimen: No specimens collected              Drains and/or Catheters: * None in log *    Tourniquet Times:     Total Tourniquet Time Documented:  Arm - Lower (Right) - 12 minutes  Total: Arm - Lower (Right) - 12 minutes      Implants:     Findings: Right carpal tunnel syndrome    Indications: Kalyani Polk is an 79 y.o. female who is having surgery for Carpal tunnel syndrome on right [G56.01].      The patient was seen in the preoperative area. The risks, benefits, complications, treatment options, non-operative alternatives, expected recovery and outcomes were discussed with the patient. The possibilities of reaction to medication, pulmonary aspiration, injury to surrounding structures, bleeding, recurrent infection, the need for additional procedures, failure to  diagnose a condition, and creating a complication requiring transfusion or operation were discussed with the patient. The patient concurred with the proposed plan, giving informed consent.  The site of surgery was properly noted/marked if necessary per policy. The patient has been actively warmed in preoperative area. Preoperative antibiotics have been ordered and given within 1 hours of incision. Venous thrombosis prophylaxis have been ordered including bilateral sequential compression devices    Procedure Details:       Indications:  Patient presents to the operating room today for surgical release of the carpal tunnel after failing attempted conservative management. In the preoperative area and the correct operative site was identified and marked for surgery. Informed consent process was completed.    Details of procedure:  The patient was brought to the operating room and placed supine on the operating table. A timeout procedure performed to verify the correct patient, procedure and operative site. Please refer to anesthesia notes regarding administration of antibiotics. After an appropriate amount of IV sedation had been obtained local anesthetic was infiltrated in the base of the palm. The upper extremity was then prepped and draped in the usual sterile fashion. The limb was exsanguinated and a tourniquet was inflated to 250 mmHg.    A longitudinal incision was created in the central aspect of the proximal palm. Sharp dissection was carried through the skin and subcutaneous tissue. Superficial palmar fascia was divided longitudinally. Deep retractors are placed. The transverse carpal ligament was identified. Under direct visualization this structure was divided longitudinally along its ulnar margin. Complete median nerve decompression was obtained. The wound was irrigated and closed in interrupted fashion.    A sterile bandage was applied. The tourniquet was deflated. Patient was transferred back to recovery  room in stable condition.    Postoperative plan will be for the patient to be discharged home once comfortable. Patient may remove bandage on postoperative day #4 and begin wound care and gentle activities as instructed. Return to clinic in approximately 2 weeks for wound check, suture removal and advancement of activities.    I am of the attending this procedure and I was present for the entire case.    Marcos Diaz M.D.    Complications:  None; patient tolerated the procedure well.    Disposition: PACU - hemodynamically stable.  Condition: stable                 Additional Details:      Attending Attestation: I was present and scrubbed for the entire procedure.    Marcos Diaz  Phone Number: [phone]

## 2024-12-06 NOTE — ANESTHESIA PREPROCEDURE EVALUATION
Patient: Kalyani Polk    Procedure Information       Date/Time: 12/06/24 1130    Procedure: Right Carpal Tunnel Release / 25 Minutes (Right: Hand)    [place] [facility] [place] [facility]    Surgeons: Marcos Diaz MD            Relevant Problems   Anesthesia (within normal limits)      Cardiac   (+) Benign essential hypertension   (+) Mixed hyperlipidemia      Pulmonary   (+) Lung nodules      Neuro   (+) Carpal tunnel syndrome of right wrist   (+) Carpal tunnel syndrome on right   (+) Chronic cervical radiculopathy      GI   (+) Gastroesophageal reflux disease without esophagitis   (+) Irritable bowel syndrome with constipation      /Renal (within normal limits)      Endocrine   (+) Thyroid enlarged      Hematology (within normal limits)      Musculoskeletal   (+) Carpal tunnel syndrome of right wrist   (+) Carpal tunnel syndrome on right       Clinical information reviewed:   Tobacco  Allergies  Meds   Med Hx  Surg Hx   Fam Hx  Soc Hx        NPO Detail:  NPO/Void Status  Carbohydrate Drink Given Prior to Surgery? : N  Date of Last Liquid: 12/06/24  Time of Last Liquid: 0830  Date of Last Solid: 12/05/24  Time of Last Solid: 2130  Last Intake Type: Clear fluids         Physical Exam    Airway  Mallampati: II  TM distance: >3 FB  Neck ROM: full     Cardiovascular - normal exam     Dental   (+) lower dentures     Pulmonary - normal exam     Abdominal - normal exam       Other findings: Lower partials      Anesthesia Plan    History of general anesthesia?: yes  History of complications of general anesthesia?: no    ASA 2     MAC     The patient is not a current smoker.    intravenous induction   Anesthetic plan and risks discussed with patient.    Plan discussed with CAA.

## 2024-12-09 ASSESSMENT — PAIN SCALES - GENERAL: PAINLEVEL_OUTOF10: 3

## 2024-12-10 ENCOUNTER — HOSPITAL ENCOUNTER (OUTPATIENT)
Dept: RADIOLOGY | Facility: EXTERNAL LOCATION | Age: 79
Discharge: HOME | End: 2024-12-10

## 2024-12-10 ENCOUNTER — OFFICE VISIT (OUTPATIENT)
Dept: ORTHOPEDIC SURGERY | Facility: HOSPITAL | Age: 79
End: 2024-12-10
Payer: MEDICARE

## 2024-12-10 VITALS — BODY MASS INDEX: 28.88 KG/M2 | HEIGHT: 63 IN | WEIGHT: 163 LBS

## 2024-12-10 DIAGNOSIS — G89.29 CHRONIC PAIN OF BOTH KNEES: ICD-10-CM

## 2024-12-10 DIAGNOSIS — M25.562 CHRONIC PAIN OF BOTH KNEES: ICD-10-CM

## 2024-12-10 DIAGNOSIS — M17.11 PRIMARY OSTEOARTHRITIS OF RIGHT KNEE: ICD-10-CM

## 2024-12-10 DIAGNOSIS — M25.561 CHRONIC PAIN OF BOTH KNEES: ICD-10-CM

## 2024-12-10 DIAGNOSIS — M17.0 PRIMARY OSTEOARTHRITIS OF BOTH KNEES: ICD-10-CM

## 2024-12-10 PROCEDURE — 99214 OFFICE O/P EST MOD 30 MIN: CPT | Performed by: STUDENT IN AN ORGANIZED HEALTH CARE EDUCATION/TRAINING PROGRAM

## 2024-12-10 PROCEDURE — 20611 DRAIN/INJ JOINT/BURSA W/US: CPT | Mod: RT | Performed by: STUDENT IN AN ORGANIZED HEALTH CARE EDUCATION/TRAINING PROGRAM

## 2024-12-10 PROCEDURE — L1812 KO ELASTIC W/JOINTS PRE OTS: HCPCS | Performed by: STUDENT IN AN ORGANIZED HEALTH CARE EDUCATION/TRAINING PROGRAM

## 2024-12-10 PROCEDURE — 2500000004 HC RX 250 GENERAL PHARMACY W/ HCPCS (ALT 636 FOR OP/ED): Performed by: STUDENT IN AN ORGANIZED HEALTH CARE EDUCATION/TRAINING PROGRAM

## 2024-12-10 PROCEDURE — 1123F ACP DISCUSS/DSCN MKR DOCD: CPT | Performed by: STUDENT IN AN ORGANIZED HEALTH CARE EDUCATION/TRAINING PROGRAM

## 2024-12-10 PROCEDURE — 1159F MED LIST DOCD IN RCRD: CPT | Performed by: STUDENT IN AN ORGANIZED HEALTH CARE EDUCATION/TRAINING PROGRAM

## 2024-12-10 RX ORDER — LIDOCAINE HYDROCHLORIDE 10 MG/ML
2 INJECTION, SOLUTION INFILTRATION; PERINEURAL
Status: COMPLETED | OUTPATIENT
Start: 2024-12-10 | End: 2024-12-10

## 2024-12-10 RX ORDER — ROPIVACAINE HYDROCHLORIDE 5 MG/ML
2 INJECTION, SOLUTION EPIDURAL; INFILTRATION; PERINEURAL
Status: COMPLETED | OUTPATIENT
Start: 2024-12-10 | End: 2024-12-10

## 2024-12-10 RX ORDER — METHYLPREDNISOLONE ACETATE 40 MG/ML
80 INJECTION, SUSPENSION INTRA-ARTICULAR; INTRALESIONAL; INTRAMUSCULAR; SOFT TISSUE
Status: COMPLETED | OUTPATIENT
Start: 2024-12-10 | End: 2024-12-10

## 2024-12-10 ASSESSMENT — PAIN - FUNCTIONAL ASSESSMENT: PAIN_FUNCTIONAL_ASSESSMENT: 0-10

## 2024-12-10 ASSESSMENT — PAIN SCALES - GENERAL: PAINLEVEL_OUTOF10: 0 - NO PAIN

## 2024-12-10 NOTE — PROGRESS NOTES
Sports Medicine Office Note    Today's Date:  12/10/2024     HPI: Kalyani Polk is a 79 y.o. female who presents today for evaluation of bilateral knee pain.  She states her pain started roughly 3-4 months ago with insidious onset and no associated injury/trauma.  States pain is worse in the right knee compared to the left, worse with standing/walking for longer durations.  States she has noticed mild swelling of her right knee but denies any redness, warmth, bruising.  States pain is throughout the entire right knee without any radiation of pain, numbness, tingling, weakness.  She has tried Tylenol, Voltaren gel, icing all with minimal to no improvement.  She has also been prescribed hydrocodone for recent carpal tunnel surgery, but states this does not seem to help much with her knee pain.  She denies any history of surgeries to either knee.    She has no other complaints.    Physical Examination:     The RIGHT knee has grade 1-2 joint effusion. Patella crepitus and grind are positive. There is tenderness to the medial and lateral joint lines. Flexion and extension are without mechanical blocking.  Valgus and varus stress testing at 0 and 30 degrees of flexion elicited medial and lateral joint line laxity with associated pain that improved with return to neutral position.  Otherwise, there is no instability with stress testing.  The LEFT knee has trace to grade 1 joint effusion. Patella crepitus and grind are positive. There is minimal tenderness to the medial and lateral joint lines. Flexion and extension are without mechanical blocking. There is no instability with stress testing.  Skin - no rashes, sores, or open lesions. Strength, sensory and vascular exams are otherwise normal. There is no clubbing, cyanosis or edema.  Gait is slightly antalgic and tandem.     Imaging:  Radiographs of bilateral knees obtained 11/19/2024 were reviewed and revealed mild to moderate tricompartmental DJD with chondrocalcinosis,  otherwise no acute osseous abnormalities.  The studies were reviewed by me personally in the office today.    === 11/19/24 ===    XR KNEE 3 VIEWS RIGHT    - Impression -  Mild chondrocalcinosis. Mild osteophytosis. Small effusion    MACRO:  None    Signed by: Benigno Sales 11/20/2024 7:37 PM  Dictation workstation:   [id number]    Problem List Items Addressed This Visit    None  Visit Diagnoses         Codes    Chronic pain of both knees     M25.561, M25.562, G89.29    Primary osteoarthritis of both knees     M17.0    Relevant Orders    Referral to Physical Therapy    Primary osteoarthritis of right knee     M17.11    Relevant Orders    Point of Care Ultrasound (Completed)    Knee Brace, Hinged    L Inj/Asp: R knee          Procedure Note:  After consent was obtained, the right knee was prepped in a sterile fashion. Ultrasound guidance was used to help insure proper needle placement into the  joint , decrease patient discomfort, and decrease collateral damage. The joint was visualized and 10 cc straw-colored with blood-tinged fluid aspirated from the knee joint.  Then, using the same needle, the syringe was changed and Depo-Medrol 80 mg with lidocaine 1% 2 mL & ropivacaine 0.5% 2 mL were injected without any complications. Ultrasound images were saved on an internal file for later reference. The patient tolerated the procedure well and the area was cleaned and bandaged.  Patient ID: Kalyani Polk is a 79 y.o. female.    L Inj/Asp: R knee on 12/10/2024 10:06 AM  Indications: pain  Details: 18 G needle, ultrasound-guided superolateral approach  Medications: 2 mL lidocaine 10 mg/mL (1 %); 80 mg methylPREDNISolone acetate 40 mg/mL; 2 mL ropivacaine 5 mg/mL (0.5 %)  Aspirate: 10 mL yellow and blood-tinged  Outcome: tolerated well, no immediate complications  Procedure, treatment alternatives, risks and benefits explained, specific risks discussed. Consent was given by the patient. Immediately prior to procedure a time  out was called to verify the correct patient, procedure, equipment, support staff and site/side marked as required. Patient was prepped and draped in the usual sterile fashion.         Assessment and Plan:    We reviewed the exam and x-ray findings and discussed the conservative and surgical treatment options. We agreed to a diagnostic and hopefully therapeutic joint aspiration and cortisone injection into the right knee joint.   She tolerated this well. Activity modifications were reviewed. Physical therapy referral provided and discussed the importance of regular home exercises.  Recommended prescription doses of Tylenol, Voltaren gel, and encouraged use of ice or heat as needed for acute flares of pain.  Econo hinged knee brace provided today and instructed patient to wear this with daily activity and exercise.  Discussed with patient that we may consider repeating cortisone injection after 3 months or more if providing adequate relief.  May also consider cortisone injection of the left knee if pain worsens, otherwise she should continue with PT and OTC medications as discussed.  Plan for follow-up in 2 months for re-evaluation, otherwise may follow-up sooner if any new concerns arise.  Discussed this plan with the patient who is understanding and agreeable.    Patient was prescribed a Econo hinged knee brace for right knee DJD with associated joint line laxity/pain. The patient is ambulatory with or without aid; but, has weakness, instability and/or deformity of their right knee which requires stabilization from this orthosis to improve their function.      Verbal and written instructions for the use, wear schedule, cleaning and application of this item were given.  Patient was instructed that should the brace result in increased pain, decreased sensation, increased swelling, or an overall worsening of their medical condition, to please contact our office immediately.     Orthotic management and training was  provided for skin care, modifications due to healing tissues, edema changes, interruption in skin integrity, and safety precautions with the orthosis.     **This note was dictated using Dragon speech recognition software and was not corrected for spelling or grammatical errors**.    Armani Munson, [facility] Sports Medicine  [facility] [place]

## 2024-12-24 ENCOUNTER — OFFICE VISIT (OUTPATIENT)
Dept: ORTHOPEDIC SURGERY | Facility: HOSPITAL | Age: 79
End: 2024-12-24
Payer: MEDICARE

## 2024-12-24 VITALS — WEIGHT: 163 LBS | BODY MASS INDEX: 28.88 KG/M2 | HEIGHT: 63 IN

## 2024-12-24 DIAGNOSIS — G56.01 CARPAL TUNNEL SYNDROME ON RIGHT: Primary | ICD-10-CM

## 2024-12-24 PROCEDURE — 1036F TOBACCO NON-USER: CPT | Performed by: ORTHOPAEDIC SURGERY

## 2024-12-24 PROCEDURE — 1125F AMNT PAIN NOTED PAIN PRSNT: CPT | Performed by: ORTHOPAEDIC SURGERY

## 2024-12-24 PROCEDURE — 1123F ACP DISCUSS/DSCN MKR DOCD: CPT | Performed by: ORTHOPAEDIC SURGERY

## 2024-12-24 PROCEDURE — 1159F MED LIST DOCD IN RCRD: CPT | Performed by: ORTHOPAEDIC SURGERY

## 2024-12-24 PROCEDURE — 99211 OFF/OP EST MAY X REQ PHY/QHP: CPT | Performed by: ORTHOPAEDIC SURGERY

## 2024-12-24 ASSESSMENT — PAIN DESCRIPTION - DESCRIPTORS: DESCRIPTORS: ACHING;SORE

## 2024-12-24 ASSESSMENT — PAIN SCALES - GENERAL: PAINLEVEL_OUTOF10: 4

## 2024-12-24 ASSESSMENT — PAIN - FUNCTIONAL ASSESSMENT: PAIN_FUNCTIONAL_ASSESSMENT: 0-10

## 2024-12-26 DIAGNOSIS — K21.9 GASTROESOPHAGEAL REFLUX DISEASE WITHOUT ESOPHAGITIS: ICD-10-CM

## 2024-12-26 RX ORDER — PANTOPRAZOLE SODIUM 40 MG/1
40 TABLET, DELAYED RELEASE ORAL DAILY
Qty: 90 TABLET | Refills: 0 | Status: SHIPPED | OUTPATIENT
Start: 2024-12-26

## 2025-01-12 DIAGNOSIS — I10 BENIGN ESSENTIAL HYPERTENSION: ICD-10-CM

## 2025-01-12 RX ORDER — AMLODIPINE BESYLATE 10 MG/1
10 TABLET ORAL DAILY
Qty: 90 TABLET | Refills: 1 | Status: SHIPPED | OUTPATIENT
Start: 2025-01-12

## 2025-01-21 ENCOUNTER — EVALUATION (OUTPATIENT)
Dept: PHYSICAL THERAPY | Facility: CLINIC | Age: 80
End: 2025-01-21
Payer: MEDICARE

## 2025-01-21 DIAGNOSIS — M25.561 RIGHT KNEE PAIN: Primary | ICD-10-CM

## 2025-01-21 DIAGNOSIS — G89.29 CHRONIC PAIN OF BOTH KNEES: ICD-10-CM

## 2025-01-21 DIAGNOSIS — M17.0 PRIMARY OSTEOARTHRITIS OF BOTH KNEES: ICD-10-CM

## 2025-01-21 DIAGNOSIS — M25.562 CHRONIC PAIN OF BOTH KNEES: ICD-10-CM

## 2025-01-21 DIAGNOSIS — M25.561 CHRONIC PAIN OF BOTH KNEES: ICD-10-CM

## 2025-01-21 PROBLEM — M25.362 PATELLOFEMORAL INSTABILITY OF BOTH KNEES WITH PAIN: Status: ACTIVE | Noted: 2025-01-21

## 2025-01-21 PROBLEM — M25.361 PATELLOFEMORAL INSTABILITY OF BOTH KNEES WITH PAIN: Status: ACTIVE | Noted: 2025-01-21

## 2025-01-21 PROCEDURE — 97110 THERAPEUTIC EXERCISES: CPT | Mod: GP

## 2025-01-21 PROCEDURE — 97162 PT EVAL MOD COMPLEX 30 MIN: CPT | Mod: GP

## 2025-01-21 ASSESSMENT — PAIN - FUNCTIONAL ASSESSMENT: PAIN_FUNCTIONAL_ASSESSMENT: 0-10

## 2025-01-21 ASSESSMENT — PAIN SCALES - GENERAL: PAINLEVEL_OUTOF10: 7

## 2025-01-21 ASSESSMENT — ENCOUNTER SYMPTOMS
DEPRESSION: 0
OCCASIONAL FEELINGS OF UNSTEADINESS: 0
LOSS OF SENSATION IN FEET: 0

## 2025-01-21 NOTE — PROGRESS NOTES
Physical Therapy    Physical Therapy Evaluation    Patient Name: Kalyani Polk  MRN: [id number]  Today's Date: 1/21/2025    Time Entry:  Time Calculation  Start Time: 1430  Stop Time: 1515  Time Calculation (min): 45 min  PT Evaluation Time Entry  PT Evaluation (Moderate) Time Entry: 30  PT Therapeutic Procedures Time Entry  Therapeutic Exercise Time Entry: 12                 Visit #1  Insurance; [employer]  Cert; 1/25/2025 - 4/16/2025  Problem List Items Addressed This Visit             ICD-10-CM       Musculoskeletal and Injuries    Primary osteoarthritis of both knees M17.0    Relevant Orders    Follow Up In Physical Therapy    Right knee pain - Primary M25.561    Relevant Orders    Follow Up In Physical Therapy       Assessment  Patient presents with chronic intermittent right knee pain, with primary cause being OA. Patient demo functional mobility in stairs and level floors but with difficulties due to knee pain. Patient demonstrating difficulties and modifications with transfers sit/stand, stair climbing and turning while on feet.  Soft knee brace is worn. Patient does not have there ex in place in order to exercise muscles supporting knee. ROM is functional but met with pain at end range of extension and flexion. Strength is slightly reduced bellow normal baselines. Patient will benefit from skilled PT in learning necessary there ex to improve mobility baselines, knee ROM and pain. Patient is motivated and agreeable with POC>     Plan  Treatment/Interventions: Education/ Instruction, Gait training, Manual therapy, Therapeutic activities, Therapeutic exercises  PT Plan: Skilled PT  PT Frequency:  (Recommended frequency; 1-2 sessions per week for total of 8 sessions)  Certification Period Start Date: 01/21/25  Certification Period End Date: 04/16/25  Rehab Potential: Good  Plan of Care Agreement: Patient    Current Problem  1. Right knee pain  Follow Up In Physical Therapy      2. Primary osteoarthritis  of both knees  Referral to Physical Therapy    Follow Up In Physical Therapy      3. Chronic pain of both knees  Follow Up In Physical Therapy          Subjective   General:  General  Reason for Referral: PT eval and treat; bilateral knees pain  Referred By: DR Arpit Lomeli  Past Medical History Relevant to Rehab: Primary OA of bilateral knees (Right knee hurts the most and its most concerning)  General Comment: Received shot in right knee in December of 2024. Shot did not help much with pain  Precautions: NA  Precautions  STEADI Fall Risk Score (The score of 4 or more indicates an increased risk of falling): 2  Vital Signs: NA     Pain:  Pain Assessment: 0-10  0-10 (Numeric) Pain Score: 7  Pain Type: Chronic pain  Pain Location:  (Right knee)  Pain Orientation:  (Right knee)  Pain Radiating Towards: NA  Pain Descriptors:  (Ache, with occasional throbbing)  Pain Frequency: Intermittent  Clinical Progression:  (Feel pain more frequenlty)  Effect of Pain on Daily Activities: I am struggling with getting up and down, turning, and moving period  Patient's Stated Pain Goal:  (0/10 pain)  Pain Interventions:  (Taking Ibuprofen or Tyleno on occasion)  Response to Interventions:  (Partial relief achieved for a short time, when I medicate)  Home Living: NA     Prior Function Per Pt/Caregiver Report: NA     Objective   Posture: WNL     Range of Motion: Right knee  0-120 AROM  0-125 PROM, with increased pain at end range of flexion and extension     Strength: Right Quads 4+/5  Right Hamstrings 4+/5  Right hip flexors 4/5  Right hip abductors 4+/5     Flexibility: NA     Palpation: tenderness present around right patella, medial and lateral knee joint line and lateral Hamstrings insertions      Special Tests:     Gait: normal priya on level floor  Two feet to each step climbing up stairs     Balance: fair     Bed Mobility: independent      Transfers: arm assist preferred, due to knee pain with WB  5 x sit/stand  = 18 seconds       Outcome Measures:  LEFS =  60/80    OP EDUCATION:  Outpatient Education  Individual(s) Educated: Patient, Spouse  Education Provided: Anatomy, Body Mechanics, Home Exercise Program, POC  Diagnosis and Precautions: OA causing pain in right knee  Risk and Benefits Discussed with Patient/Caregiver/Other: yes  Patient/Caregiver Demonstrated Understanding: yes  Plan of Care Discussed and Agreed Upon: yes  Patient Response to Education: Patient/Caregiver Verbalized Understanding of Information  Education Comment: HEP and POC configuration    PT intervention;  Access Code: [email or web address]  URL: [email or web address]  Date: 01/21/2025  Prepared by: Iris Mcclendon    Exercises  - Supine Quad Set  - 1 x daily - 7 x weekly - 3 sets - 10 reps  - Supine Heel Slide with Strap  - 1 x daily - 7 x weekly - 3 sets - 10 reps  - Seated Long Arc Quad  - 1 x daily - 7 x weekly - 3 sets - 10 reps  - Sit to Stand  - 1 x daily - 7 x weekly - 3 sets - 10 reps    Goals:  Active       PT Problem       PT Goal 1       Start:  01/21/25    Expected End:  04/16/25       Patient will be able to demonstrated and report improved functional ambulation with different daily tasks and/or recreational activities, as evident by LEFS of  66 or better           PT Goal 2       Start:  01/21/25    Expected End:  04/16/25       Patient will report reduced/abolished pain in right knee, indicated by grade of 0-2 on 0-10 pain scale          PT Goal 3       Start:  01/21/25    Expected End:  04/16/25       Full right knee ROM of 0-125, pain free at end range          PT Goal 4       Start:  01/21/25    Expected End:  04/16/25       Demo full and functional Quads and Gluteals strength of 5/5, evident by ease of walking, stair climbing and transferring sit/stand without arm assist

## 2025-01-28 ENCOUNTER — TREATMENT (OUTPATIENT)
Dept: PHYSICAL THERAPY | Facility: CLINIC | Age: 80
End: 2025-01-28
Payer: MEDICARE

## 2025-01-28 DIAGNOSIS — G89.29 CHRONIC PAIN OF BOTH KNEES: ICD-10-CM

## 2025-01-28 DIAGNOSIS — M17.0 PRIMARY OSTEOARTHRITIS OF BOTH KNEES: ICD-10-CM

## 2025-01-28 DIAGNOSIS — M25.561 CHRONIC PAIN OF BOTH KNEES: ICD-10-CM

## 2025-01-28 DIAGNOSIS — M25.561 RIGHT KNEE PAIN: ICD-10-CM

## 2025-01-28 DIAGNOSIS — M25.562 CHRONIC PAIN OF BOTH KNEES: ICD-10-CM

## 2025-01-28 PROCEDURE — 97110 THERAPEUTIC EXERCISES: CPT | Mod: GP,CQ

## 2025-01-28 ASSESSMENT — PAIN SCALES - GENERAL: PAINLEVEL_OUTOF10: 6

## 2025-01-28 ASSESSMENT — PAIN - FUNCTIONAL ASSESSMENT: PAIN_FUNCTIONAL_ASSESSMENT: 0-10

## 2025-01-28 NOTE — PROGRESS NOTES
Physical Therapy    Physical Therapy Evaluation    Patient Name: Kalyani Polk  MRN: [id number]  Today's Date: 1/28/2025  Time Entry:  Time Calculation  Start Time: 0145  Stop Time: 0230  Time Calculation (min): 45 min     PT Therapeutic Procedures Time Entry  Therapeutic Exercise Time Entry: 45                 Visit #2  Insurance; [employer]  Cert; 1/25/2025 - 4/16/2025    Problem List Items Addressed This Visit             ICD-10-CM    Primary osteoarthritis of both knees M17.0    Right knee pain M25.561     Other Visit Diagnoses         Codes    Chronic pain of both knees     M25.561, M25.562, G89.29            Assessment  1/28/25  Pain level remained constant through session at a 6/10, no painful increase with given therex although popping here and there. Grasp and understanding of exercises given is good. Sit to stands were most challenging. Enjoys icing at end of session, reports pain level/reduction to 3/10.     Eval  Patient presents with chronic intermittent right knee pain, with primary cause being OA. Patient demo functional mobility in stairs and level floors but with difficulties due to knee pain. Patient demonstrating difficulties and modifications with transfers sit/stand, stair climbing and turning while on feet.  Soft knee brace is worn. Patient does not have there ex in place in order to exercise muscles supporting knee. ROM is functional but met with pain at end range of extension and flexion. Strength is slightly reduced bellow normal baselines. Patient will benefit from skilled PT in learning necessary there ex to improve mobility baselines, knee ROM and pain. Patient is motivated and agreeable with POC>     Plan   Treatment/Interventions: Education/ Instruction, Gait training, Manual therapy, Therapeutic activities, Therapeutic exercises  PT Plan: Skilled PT  PT Frequency:  (Recommended frequency; 1-2 sessions per week for total of 8 sessions)  Certification Period Start Date:  01/21/25  Certification Period End Date: 04/16/25  Rehab Potential: Good  Plan of Care Agreement: Patient    Current Problem  1. Primary osteoarthritis of both knees  Follow Up In Physical Therapy      2. Chronic pain of both knees  Follow Up In Physical Therapy      3. Right knee pain  Follow Up In Physical Therapy        Subjective    Right knee worse than left  Pain most of the time  Current pain level of the right knee 6/10  No pain in the left knee  HEP completed here and there    Precautions: NA     Vital Signs: NA     Pain:  Pain Assessment: 0-10  0-10 (Numeric) Pain Score: 6  Home Living: NA     Prior Function Per Pt/Caregiver Report: NA     Objective   Posture: WNL     Range of Motion: Right knee  0-120 AROM  0-125 PROM, with increased pain at end range of flexion and extension     Strength: Right Quads 4+/5  Right Hamstrings 4+/5  Right hip flexors 4/5  Right hip abductors 4+/5     Flexibility: NA     Palpation: tenderness present around right patella, medial and lateral knee joint line and lateral Hamstrings insertions      Special Tests:     Gait: normal priya on level floor  Two feet to each step climbing up stairs     Balance: fair     Bed Mobility: independent      Transfers: arm assist preferred, due to knee pain with WB  5 x sit/stand  = 18 seconds      Outcome Measures:  LEFS =  60/80    OP EDUCATION:       PT intervention;  Bike x 8 min  Lunge stretch on 2nd step x 20  Wedge calf stretch 3 x 30 seconds  Supine heel slide with strap x 20  Sit to stand x 10  Bridge with hip adduction x 15  Hip abduction GTB x 15  LAQ x 20 2.5#  SLR with quad set x 15    Access Code: [email or web address]  URL: [email or web address]  Date: 01/21/2025  Prepared by: Iris Mcclendon    Exercises  - Supine Quad Set  - 1 x daily - 7 x weekly - 3 sets - 10 reps  - Supine Heel Slide with Strap  - 1 x daily - 7 x weekly - 3 sets - 10 reps  - Seated Long Arc Quad  - 1 x daily - 7 x weekly - 3 sets - 10 reps  - Sit  to Stand  - 1 x daily - 7 x weekly - 3 sets - 10 reps    Goals:  Active       PT Problem       PT Goal 1       Start:  01/21/25    Expected End:  04/16/25       Patient will be able to demonstrated and report improved functional ambulation with different daily tasks and/or recreational activities, as evident by LEFS of  66 or better           PT Goal 2       Start:  01/21/25    Expected End:  04/16/25       Patient will report reduced/abolished pain in right knee, indicated by grade of 0-2 on 0-10 pain scale          PT Goal 3       Start:  01/21/25    Expected End:  04/16/25       Full right knee ROM of 0-125, pain free at end range          PT Goal 4       Start:  01/21/25    Expected End:  04/16/25       Demo full and functional Quads and Gluteals strength of 5/5, evident by ease of walking, stair climbing and transferring sit/stand without arm assist

## 2025-02-03 ENCOUNTER — TREATMENT (OUTPATIENT)
Dept: PHYSICAL THERAPY | Facility: CLINIC | Age: 80
End: 2025-02-03
Payer: MEDICARE

## 2025-02-03 DIAGNOSIS — M25.561 CHRONIC PAIN OF BOTH KNEES: ICD-10-CM

## 2025-02-03 DIAGNOSIS — G89.29 CHRONIC PAIN OF BOTH KNEES: ICD-10-CM

## 2025-02-03 DIAGNOSIS — M25.562 CHRONIC PAIN OF BOTH KNEES: ICD-10-CM

## 2025-02-03 DIAGNOSIS — M25.561 RIGHT KNEE PAIN: ICD-10-CM

## 2025-02-03 DIAGNOSIS — M17.0 PRIMARY OSTEOARTHRITIS OF BOTH KNEES: ICD-10-CM

## 2025-02-03 PROCEDURE — 97110 THERAPEUTIC EXERCISES: CPT | Mod: GP

## 2025-02-03 NOTE — PROGRESS NOTES
Physical Therapy    Physical Therapy Evaluation    Patient Name: Kalyani Polk  MRN: [id number]  Today's Date: 2/3/2025  Time Entry:  Time Calculation  Start Time: 0850  Stop Time: 0930  Time Calculation (min): 40 min     PT Therapeutic Procedures Time Entry  Therapeutic Exercise Time Entry: 40                 Visit # 3  Insurance; [employer]  Cert; 1/25/2025 - 4/16/2025    Problem List Items Addressed This Visit             ICD-10-CM       Musculoskeletal and Injuries    Primary osteoarthritis of both knees M17.0    Chronic pain of both knees M25.561, M25.562, G89.29       Assessment  2/3/2025;   Patient performing well with closed chain exercises. Body mechanics look good. Patient likely won't need visits after 2/27/2025  Eval  Patient presents with chronic intermittent right knee pain, with primary cause being OA. Patient demo functional mobility in stairs and level floors but with difficulties due to knee pain. Patient demonstrating difficulties and modifications with transfers sit/stand, stair climbing and turning while on feet.  Soft knee brace is worn. Patient does not have there ex in place in order to exercise muscles supporting knee. ROM is functional but met with pain at end range of extension and flexion. Strength is slightly reduced bellow normal baselines. Patient will benefit from skilled PT in learning necessary there ex to improve mobility baselines, knee ROM and pain. Patient is motivated and agreeable with POC>     Plan   Treatment/Interventions: Education/ Instruction, Gait training, Manual therapy, Therapeutic activities, Therapeutic exercises  PT Plan: Skilled PT  PT Frequency:  (Recommended frequency; 1-2 sessions per week for total of 8 sessions)  Certification Period Start Date: 01/21/25  Certification Period End Date: 04/16/25  Rehab Potential: Good  Plan of Care Agreement: Patient    Current Problem  1. Primary osteoarthritis of both knees  Follow Up In Physical Therapy      2.  Chronic pain of both knees  Follow Up In Physical Therapy      3. Right knee pain  Follow Up In Physical Therapy        Subjective   Feeling okay this morning. Not much knee discomfort, but rather some stiffness    Precautions: NA     Vital Signs: NA     Pain: 0-5/10 knees     Home Living: NA     Prior Function Per Pt/Caregiver Report: NA     Objective   Posture: WNL     Range of Motion: Right knee  0-120 AROM  0-125 PROM, with increased pain at end range of flexion and extension     Strength: Right Quads 4+/5  Right Hamstrings 4+/5  Right hip flexors 4/5  Right hip abductors 4+/5     Flexibility: NA     Palpation: tenderness present around right patella, medial and lateral knee joint line and lateral Hamstrings insertions      Special Tests:     Gait: normal priya on level floor  Two feet to each step climbing up stairs     Balance: fair     Bed Mobility: independent      Transfers: arm assist preferred, due to knee pain with WB  5 x sit/stand  = 18 seconds      Outcome Measures:  LEFS =  60/80    OP EDUCATION:       PT intervention;  Stepper x 8 min  Lunging on stairs  Hamstrings stretches  Heel raises  March in bars  SLRs   Sit/stand from edge of bed and from chair  Heel slides with straps    Access Code: [email or web address]  URL: [email or web address]  Date: 01/21/2025  Prepared by: Iris Mcclendon    Exercises  - Supine Quad Set  - 1 x daily - 7 x weekly - 3 sets - 10 reps  - Supine Heel Slide with Strap  - 1 x daily - 7 x weekly - 3 sets - 10 reps  - Seated Long Arc Quad  - 1 x daily - 7 x weekly - 3 sets - 10 reps  - Sit to Stand  - 1 x daily - 7 x weekly - 3 sets - 10 reps    Goals:  Active       PT Problem       PT Goal 1       Start:  01/21/25    Expected End:  04/16/25       Patient will be able to demonstrated and report improved functional ambulation with different daily tasks and/or recreational activities, as evident by LEFS of  66 or better           PT Goal 2       Start:  01/21/25     Expected End:  04/16/25       Patient will report reduced/abolished pain in right knee, indicated by grade of 0-2 on 0-10 pain scale          PT Goal 3       Start:  01/21/25    Expected End:  04/16/25       Full right knee ROM of 0-125, pain free at end range          PT Goal 4       Start:  01/21/25    Expected End:  04/16/25       Demo full and functional Quads and Gluteals strength of 5/5, evident by ease of walking, stair climbing and transferring sit/stand without arm assist

## 2025-02-04 ENCOUNTER — APPOINTMENT (OUTPATIENT)
Dept: ORTHOPEDIC SURGERY | Facility: HOSPITAL | Age: 80
End: 2025-02-04
Payer: MEDICARE

## 2025-02-04 DIAGNOSIS — M17.0 PRIMARY OSTEOARTHRITIS OF BOTH KNEES: Primary | ICD-10-CM

## 2025-02-04 DIAGNOSIS — M76.31 IT BAND SYNDROME, RIGHT: ICD-10-CM

## 2025-02-04 DIAGNOSIS — M76.811 ANTERIOR TIBIALIS TENDINITIS OF RIGHT LOWER EXTREMITY: ICD-10-CM

## 2025-02-04 PROCEDURE — 99214 OFFICE O/P EST MOD 30 MIN: CPT | Performed by: STUDENT IN AN ORGANIZED HEALTH CARE EDUCATION/TRAINING PROGRAM

## 2025-02-04 PROCEDURE — 1123F ACP DISCUSS/DSCN MKR DOCD: CPT | Performed by: STUDENT IN AN ORGANIZED HEALTH CARE EDUCATION/TRAINING PROGRAM

## 2025-02-10 ENCOUNTER — TREATMENT (OUTPATIENT)
Dept: PHYSICAL THERAPY | Facility: CLINIC | Age: 80
End: 2025-02-10
Payer: MEDICARE

## 2025-02-10 DIAGNOSIS — G89.29 CHRONIC PAIN OF BOTH KNEES: Primary | ICD-10-CM

## 2025-02-10 DIAGNOSIS — M25.561 RIGHT KNEE PAIN: ICD-10-CM

## 2025-02-10 DIAGNOSIS — M17.0 PRIMARY OSTEOARTHRITIS OF BOTH KNEES: ICD-10-CM

## 2025-02-10 DIAGNOSIS — M25.562 CHRONIC PAIN OF BOTH KNEES: Primary | ICD-10-CM

## 2025-02-10 DIAGNOSIS — M25.561 CHRONIC PAIN OF BOTH KNEES: Primary | ICD-10-CM

## 2025-02-10 PROCEDURE — 97110 THERAPEUTIC EXERCISES: CPT | Mod: GP

## 2025-02-10 NOTE — PROGRESS NOTES
Physical Therapy    Physical Therapy Evaluation    Patient Name: Kalyani Plok  MRN: [id number]  Today's Date: 2/10/2025  Time Entry:  Time Calculation  Start Time: 0900  Stop Time: 0945  Time Calculation (min): 45 min     PT Therapeutic Procedures Time Entry  Therapeutic Exercise Time Entry: 45                 Visit # 4  Insurance; [employer]  Cert; 1/25/2025 - 4/16/2025    Problem List Items Addressed This Visit             ICD-10-CM       Musculoskeletal and Injuries    Primary osteoarthritis of both knees M17.0    Right knee pain - Primary M25.561       Assessment  2/10/2025;   Patient responded well to revision of exercise regimen and inclusion of right Anterior Tibialis as well as ITB. Right knee pain reduced to 5/10 by end of session.     Eval  Patient presents with chronic intermittent right knee pain, with primary cause being OA. Patient demo functional mobility in stairs and level floors but with difficulties due to knee pain. Patient demonstrating difficulties and modifications with transfers sit/stand, stair climbing and turning while on feet.  Soft knee brace is worn. Patient does not have there ex in place in order to exercise muscles supporting knee. ROM is functional but met with pain at end range of extension and flexion. Strength is slightly reduced bellow normal baselines. Patient will benefit from skilled PT in learning necessary there ex to improve mobility baselines, knee ROM and pain. Patient is motivated and agreeable with POC>     Plan   Treatment/Interventions: Education/ Instruction, Gait training, Manual therapy, Therapeutic activities, Therapeutic exercises  PT Plan: Skilled PT  PT Frequency:  (Recommended frequency; 1-2 sessions per week for total of 8 sessions)  Certification Period Start Date: 01/21/25  Certification Period End Date: 04/16/25  Rehab Potential: Good  Plan of Care Agreement: Patient    Current Problem  1. Chronic pain of both knees  Follow Up In Physical Therapy       2. Primary osteoarthritis of both knees  Follow Up In Physical Therapy      3. Right knee pain  Follow Up In Physical Therapy        Subjective   Did not take anything this morning for pain or any of my medications yet. 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 Will be off from therapy until 2/20/2025 due to 's surgery    Precautions: NA     Vital Signs: NA     Pain: 7/10 knees - right knee     Home Living: NA     Prior Function Per Pt/Caregiver Report: NA     Objective   Posture: WNL     Range of Motion: Right knee  0-120 AROM  0-125 PROM, with increased pain at end range of flexion and extension     Strength: Right Quads 4+/5  Right Hamstrings 4+/5  Right hip flexors 4/5  Right hip abductors 4+/5     Flexibility: NA     Palpation: tenderness present around right patella, medial and lateral knee joint line and lateral Hamstrings insertions      Special Tests:     Gait: normal priya on level floor  Two feet to each step climbing up stairs     Balance: fair     Bed Mobility: independent      Transfers: arm assist preferred, due to knee pain with WB  5 x sit/stand  = 18 seconds      Outcome Measures:  LEFS =  60/80    OP EDUCATION:     PT intervention;  Stepper x 8 min  Isometric right Quads contractions in lying; x 20 reps  Heel slides with straps  Hamstrings stretch assisted with strap; 20 seconds x 4  Added lying right ITB stretch with band; 10 seconds x 5  (Instructed standing version of ITB stretch for HEP)  Side lying Clams with resistance of red TB  SLRs with right leg    Access Code: [email or web address]  URL: [email or web address]  Date: 01/21/2025  Prepared by: Iris Mcclendon    Exercises  - Supine Quad Set  - 1 x daily - 7 x weekly - 3 sets - 10 reps  - Supine Heel Slide with Strap  - 1 x daily - 7 x weekly - 3 sets - 10 reps  - Seated Long Arc Quad  - 1 x daily - 7 x weekly - 3 sets - 10 reps  -  Sit to Stand  - 1 x daily - 7 x weekly - 3 sets - 10 reps    Goals:  Active       PT Problem       PT Goal 1       Start:  01/21/25    Expected End:  04/16/25       Patient will be able to demonstrated and report improved functional ambulation with different daily tasks and/or recreational activities, as evident by LEFS of  66 or better           PT Goal 2       Start:  01/21/25    Expected End:  04/16/25       Patient will report reduced/abolished pain in right knee, indicated by grade of 0-2 on 0-10 pain scale          PT Goal 3       Start:  01/21/25    Expected End:  04/16/25       Full right knee ROM of 0-125, pain free at end range          PT Goal 4       Start:  01/21/25    Expected End:  04/16/25       Demo full and functional Quads and Gluteals strength of 5/5, evident by ease of walking, stair climbing and transferring sit/stand without arm assist

## 2025-02-17 ENCOUNTER — TELEPHONE (OUTPATIENT)
Dept: PRIMARY CARE | Facility: CLINIC | Age: 80
End: 2025-02-17
Payer: MEDICARE

## 2025-02-17 DIAGNOSIS — E78.2 MIXED HYPERLIPIDEMIA: ICD-10-CM

## 2025-02-17 RX ORDER — ATORVASTATIN CALCIUM 10 MG/1
10 TABLET, FILM COATED ORAL DAILY
Qty: 90 TABLET | Refills: 3 | Status: SHIPPED | OUTPATIENT
Start: 2025-02-17

## 2025-02-20 ENCOUNTER — OFFICE VISIT (OUTPATIENT)
Dept: PRIMARY CARE | Facility: CLINIC | Age: 80
End: 2025-02-20
Payer: MEDICARE

## 2025-02-20 ENCOUNTER — TREATMENT (OUTPATIENT)
Dept: PHYSICAL THERAPY | Facility: CLINIC | Age: 80
End: 2025-02-20
Payer: MEDICARE

## 2025-02-20 VITALS
WEIGHT: 165 LBS | TEMPERATURE: 97.3 F | HEIGHT: 63 IN | HEART RATE: 90 BPM | OXYGEN SATURATION: 96 % | SYSTOLIC BLOOD PRESSURE: 120 MMHG | DIASTOLIC BLOOD PRESSURE: 74 MMHG | BODY MASS INDEX: 29.23 KG/M2

## 2025-02-20 DIAGNOSIS — G89.29 CHRONIC PAIN OF BOTH KNEES: ICD-10-CM

## 2025-02-20 DIAGNOSIS — M17.0 PRIMARY OSTEOARTHRITIS OF BOTH KNEES: ICD-10-CM

## 2025-02-20 DIAGNOSIS — M25.561 RIGHT KNEE PAIN: ICD-10-CM

## 2025-02-20 DIAGNOSIS — R22.41 LOCALIZED SWELLING OF RIGHT LOWER LEG: ICD-10-CM

## 2025-02-20 DIAGNOSIS — I10 BENIGN ESSENTIAL HYPERTENSION: Primary | ICD-10-CM

## 2025-02-20 DIAGNOSIS — M25.562 CHRONIC PAIN OF BOTH KNEES: ICD-10-CM

## 2025-02-20 DIAGNOSIS — M25.561 CHRONIC PAIN OF BOTH KNEES: ICD-10-CM

## 2025-02-20 DIAGNOSIS — E78.2 MIXED HYPERLIPIDEMIA: ICD-10-CM

## 2025-02-20 DIAGNOSIS — R73.09 ELEVATED GLUCOSE: ICD-10-CM

## 2025-02-20 PROCEDURE — 97110 THERAPEUTIC EXERCISES: CPT | Mod: GP,CQ

## 2025-02-20 PROCEDURE — 3078F DIAST BP <80 MM HG: CPT | Performed by: FAMILY MEDICINE

## 2025-02-20 PROCEDURE — 1160F RVW MEDS BY RX/DR IN RCRD: CPT | Performed by: FAMILY MEDICINE

## 2025-02-20 PROCEDURE — 1123F ACP DISCUSS/DSCN MKR DOCD: CPT | Performed by: FAMILY MEDICINE

## 2025-02-20 PROCEDURE — 99213 OFFICE O/P EST LOW 20 MIN: CPT | Performed by: FAMILY MEDICINE

## 2025-02-20 PROCEDURE — 1159F MED LIST DOCD IN RCRD: CPT | Performed by: FAMILY MEDICINE

## 2025-02-20 PROCEDURE — 3074F SYST BP LT 130 MM HG: CPT | Performed by: FAMILY MEDICINE

## 2025-02-20 PROCEDURE — 1036F TOBACCO NON-USER: CPT | Performed by: FAMILY MEDICINE

## 2025-02-20 PROCEDURE — 1125F AMNT PAIN NOTED PAIN PRSNT: CPT | Performed by: FAMILY MEDICINE

## 2025-02-20 ASSESSMENT — PAIN SCALES - GENERAL
PAINLEVEL_OUTOF10: 2
PAINLEVEL_OUTOF10: 3

## 2025-02-20 ASSESSMENT — PAIN - FUNCTIONAL ASSESSMENT: PAIN_FUNCTIONAL_ASSESSMENT: 0-10

## 2025-02-20 NOTE — PATIENT INSTRUCTIONS
BP well controlled.  Continue amlodipine 10 mg daily.      For cholesterol, taking atorvastatin 10 mg.  Will check fasting lipids today.    Right lower leg swelling may be associated with the knee swelling.  Will check ultrasound to make sure not DVT.  Use compression stockings, keep elevated when able.    Kidney function is slightly reduced.  Controlling BP and glucose will help.  Also should avoid taking frequent anti-inflammatories, such as ibuprofen and naproxen (Advil, Motrin, Aleve).  Tylenol (acetaminophen) is OK.  Will recheck levels today.    A1C in the early prediabetic range.  Recheck today.

## 2025-02-20 NOTE — PROGRESS NOTES
"Subjective   Patient ID: Kalyani Polk is a 79 y.o. female who presents for Leg Swelling (Right leg).  Right knee OA with swelling and pain, seeing ortho, doing PT now.    Has had swelling right foot and lower leg.  Comes and goes.  No calf pain  Better with elevation  No SOB or CP     Reviewed GFR done 5 months ago.          Review of Systems    Objective   /74 (BP Location: Right arm, Patient Position: Sitting, BP Cuff Size: Adult)   Pulse 90   Temp 36.3 °C (97.3 °F) (Temporal)   Ht 1.6 m (5' 3\")   Wt 74.8 kg (165 lb)   SpO2 96%   BMI 29.23 kg/m²     Physical Exam  Vitals reviewed.   Constitutional:       Appearance: Normal appearance.   Cardiovascular:      Rate and Rhythm: Normal rate and regular rhythm.      Heart sounds: No murmur heard.  Pulmonary:      Effort: Pulmonary effort is normal.      Breath sounds: Normal breath sounds.   Musculoskeletal:      Right lower leg: Edema (1+ on shn, ankle, foot) present.      Left lower leg: Edema (minimal) present.      Comments: No calf tenderness or warmth   Neurological:      Mental Status: She is alert.           Assessment/Plan   Problem List Items Addressed This Visit       Benign essential hypertension - Primary    Relevant Orders    Lipid Panel    Hemoglobin A1C    Comprehensive Metabolic Panel    Elevated glucose    Relevant Orders    Hemoglobin A1C    Mixed hyperlipidemia     Other Visit Diagnoses       Localized swelling of right lower leg        Relevant Orders    Vascular US lower extremity venous duplex right               "

## 2025-02-20 NOTE — PROGRESS NOTES
Physical Therapy    Physical Therapy Evaluation    Patient Name: Kalyani Polk  MRN: [id number]  Today's Date: 2/20/2025  Time Entry:  Time Calculation  Start Time: 0930  Stop Time: 1015  Time Calculation (min): 45 min     PT Therapeutic Procedures Time Entry  Therapeutic Exercise Time Entry: 45                 Visit # 5  Insurance; [employer]  Cert; 1/25/2025 - 4/16/2025    Problem List Items Addressed This Visit             ICD-10-CM    Primary osteoarthritis of both knees M17.0    Right knee pain M25.561     Other Visit Diagnoses         Codes    Chronic pain of both knees     M25.561, M25.562, G89.29            Assessment  2/20/25  Continued therex previously added for right anterior tibialis and IT band, responds mostly well with slight agitation of ant tib that fades quickly with rest. Tolerated SAQ well, longer range causes increased discomfort. Pain level remained the same as start of session.     2/10/2025;   Patient responded well to revision of exercise regimen and inclusion of right Anterior Tibialis as well as ITB. Right knee pain reduced to 5/10 by end of session.     Eval  Patient presents with chronic intermittent right knee pain, with primary cause being OA. Patient demo functional mobility in stairs and level floors but with difficulties due to knee pain. Patient demonstrating difficulties and modifications with transfers sit/stand, stair climbing and turning while on feet.  Soft knee brace is worn. Patient does not have there ex in place in order to exercise muscles supporting knee. ROM is functional but met with pain at end range of extension and flexion. Strength is slightly reduced bellow normal baselines. Patient will benefit from skilled PT in learning necessary there ex to improve mobility baselines, knee ROM and pain. Patient is motivated and agreeable with POC>     Plan   Treatment/Interventions: Education/ Instruction, Gait training, Manual therapy, Therapeutic activities,  Therapeutic exercises  PT Plan: Skilled PT  PT Frequency:  (Recommended frequency; 1-2 sessions per week for total of 8 sessions)  Certification Period Start Date: 01/21/25  Certification Period End Date: 04/16/25  Rehab Potential: Good  Plan of Care Agreement: Patient    Current Problem  1. Primary osteoarthritis of both knees  Follow Up In Physical Therapy      2. Chronic pain of both knees  Follow Up In Physical Therapy      3. Right knee pain  Follow Up In Physical Therapy        Subjective   Trying to stay active, performing my HEP daily  Pain is currently at a 2/10 in the right knee, flares up and down through out the day  Located lateral side of right knee and radiating upwards and to the front of the knee    Precautions: NA     Vital Signs: NA     Pain:   Pain Assessment: 0-10  0-10 (Numeric) Pain Score: 2    Home Living: NA     Prior Function Per Pt/Caregiver Report: NA     Objective   Posture: WNL     Range of Motion: Right knee  0-120 AROM  0-125 PROM, with increased pain at end range of flexion and extension     Strength: Right Quads 4+/5  Right Hamstrings 4+/5  Right hip flexors 4/5  Right hip abductors 4+/5     Flexibility: NA     Palpation: tenderness present around right patella, medial and lateral knee joint line and lateral Hamstrings insertions      Special Tests:     Gait: normal priya on level floor  Two feet to each step climbing up stairs     Balance: fair     Bed Mobility: independent      Transfers: arm assist preferred, due to knee pain with WB  5 x sit/stand  = 18 seconds      Outcome Measures:  LEFS =  60/80    OP EDUCATION:     PT intervention;  Stepper x 8 min  Quad set with SLR x 15  Heel toe raise x 15  Added lying right ITB stretch with band; 30 seconds x 3  Heel slides with straps x 20  Hamstrings stretch assisted with strap; 20 seconds x 4  Supine SAQ 2# x 15  Side lying Clams with resistance of red TB x 15  Hip adduction 15 x 5 seconds    Access Code: [email or web address]  URL:  [email or web address]  Date: 01/21/2025  Prepared by: Iris Moraesjic    Exercises  - Supine Quad Set  - 1 x daily - 7 x weekly - 3 sets - 10 reps  - Supine Heel Slide with Strap  - 1 x daily - 7 x weekly - 3 sets - 10 reps  - Seated Long Arc Quad  - 1 x daily - 7 x weekly - 3 sets - 10 reps  - Sit to Stand  - 1 x daily - 7 x weekly - 3 sets - 10 reps    Goals:  Active       PT Problem       PT Goal 1       Start:  01/21/25    Expected End:  04/16/25       Patient will be able to demonstrated and report improved functional ambulation with different daily tasks and/or recreational activities, as evident by LEFS of  66 or better           PT Goal 2       Start:  01/21/25    Expected End:  04/16/25       Patient will report reduced/abolished pain in right knee, indicated by grade of 0-2 on 0-10 pain scale          PT Goal 3       Start:  01/21/25    Expected End:  04/16/25       Full right knee ROM of 0-125, pain free at end range          PT Goal 4       Start:  01/21/25    Expected End:  04/16/25       Demo full and functional Quads and Gluteals strength of 5/5, evident by ease of walking, stair climbing and transferring sit/stand without arm assist

## 2025-02-21 ENCOUNTER — TELEPHONE (OUTPATIENT)
Dept: PRIMARY CARE | Facility: CLINIC | Age: 80
End: 2025-02-21
Payer: MEDICARE

## 2025-02-21 LAB
ALBUMIN SERPL-MCNC: 4.5 G/DL (ref 3.6–5.1)
ALP SERPL-CCNC: 50 U/L (ref 37–153)
ALT SERPL-CCNC: 23 U/L (ref 6–29)
ANION GAP SERPL CALCULATED.4IONS-SCNC: 9 MMOL/L (CALC) (ref 7–17)
AST SERPL-CCNC: 23 U/L (ref 10–35)
BILIRUB SERPL-MCNC: 0.7 MG/DL (ref 0.2–1.2)
BUN SERPL-MCNC: 14 MG/DL (ref 7–25)
CALCIUM SERPL-MCNC: 9.3 MG/DL (ref 8.6–10.4)
CHLORIDE SERPL-SCNC: 104 MMOL/L (ref 98–110)
CHOLEST SERPL-MCNC: 161 MG/DL
CHOLEST/HDLC SERPL: 2.4 (CALC)
CO2 SERPL-SCNC: 26 MMOL/L (ref 20–32)
CREAT SERPL-MCNC: 0.91 MG/DL (ref 0.6–0.95)
EGFRCR SERPLBLD CKD-EPI 2021: 63 ML/MIN/1.73M2
EST. AVERAGE GLUCOSE BLD GHB EST-MCNC: 128 MG/DL
EST. AVERAGE GLUCOSE BLD GHB EST-SCNC: 7.1 MMOL/L
GLUCOSE SERPL-MCNC: 91 MG/DL (ref 65–99)
HBA1C MFR BLD: 6.1 % OF TOTAL HGB
HDLC SERPL-MCNC: 66 MG/DL
LDLC SERPL CALC-MCNC: 81 MG/DL (CALC)
NONHDLC SERPL-MCNC: 95 MG/DL (CALC)
POTASSIUM SERPL-SCNC: 4.1 MMOL/L (ref 3.5–5.3)
PROT SERPL-MCNC: 7.4 G/DL (ref 6.1–8.1)
SODIUM SERPL-SCNC: 139 MMOL/L (ref 135–146)
TRIGL SERPL-MCNC: 61 MG/DL

## 2025-02-21 NOTE — TELEPHONE ENCOUNTER
Spoke to pt, informed of message below. Verbalized understanding.      ----- Message from Marilyn Hernandez sent at 2/21/2025  8:56 AM EST -----  A1C 6.1 is stable  in the prediabetic range, slightly reduced from last time.  Cholesterol is ideal.  Electrolytes, glucose, liver and kidney function are normal.

## 2025-02-21 NOTE — RESULT ENCOUNTER NOTE
A1C 6.1 is stable  in the prediabetic range, slightly reduced from last time.  Cholesterol is ideal.  Electrolytes, glucose, liver and kidney function are normal.

## 2025-02-24 ENCOUNTER — HOSPITAL ENCOUNTER (OUTPATIENT)
Dept: RADIOLOGY | Facility: CLINIC | Age: 80
Discharge: HOME | End: 2025-02-24
Payer: MEDICARE

## 2025-02-24 DIAGNOSIS — R22.41 LOCALIZED SWELLING OF RIGHT LOWER LEG: ICD-10-CM

## 2025-02-24 PROCEDURE — 93971 EXTREMITY STUDY: CPT

## 2025-02-24 PROCEDURE — 93971 EXTREMITY STUDY: CPT | Performed by: RADIOLOGY

## 2025-02-27 ENCOUNTER — TREATMENT (OUTPATIENT)
Dept: PHYSICAL THERAPY | Facility: CLINIC | Age: 80
End: 2025-02-27
Payer: MEDICARE

## 2025-02-27 DIAGNOSIS — M25.561 RIGHT KNEE PAIN: ICD-10-CM

## 2025-02-27 DIAGNOSIS — M25.561 CHRONIC PAIN OF BOTH KNEES: ICD-10-CM

## 2025-02-27 DIAGNOSIS — G89.29 CHRONIC PAIN OF BOTH KNEES: ICD-10-CM

## 2025-02-27 DIAGNOSIS — M17.0 PRIMARY OSTEOARTHRITIS OF BOTH KNEES: ICD-10-CM

## 2025-02-27 DIAGNOSIS — M25.562 CHRONIC PAIN OF BOTH KNEES: ICD-10-CM

## 2025-02-27 PROCEDURE — 97110 THERAPEUTIC EXERCISES: CPT | Mod: GP

## 2025-02-27 NOTE — PROGRESS NOTES
Physical Therapy    Physical Therapy follow up    Patient Name: Kalyani Polk  MRN: [id number]  Today's Date: 2/27/2025  Time Entry:  Time Calculation  Start Time: 0930  Stop Time: 1015  Time Calculation (min): 45 min     PT Therapeutic Procedures Time Entry  Therapeutic Exercise Time Entry: 45                 Visit # 6  Insurance; [employer]  Cert; 1/25/2025 - 4/16/2025    Problem List Items Addressed This Visit             ICD-10-CM       Musculoskeletal and Injuries    Primary osteoarthritis of both knees M17.0    Chronic pain of both knees M25.561, M25.562, G89.29       Assessment  2/27/2025;  Exercises modified to avoid right knee flare up. Patient responded well and able to transfer sit/stand without arm assist and without knee pain at end of session     Jonathan  Patient presents with chronic intermittent right knee pain, with primary cause being OA. Patient demo functional mobility in stairs and level floors but with difficulties due to knee pain. Patient demonstrating difficulties and modifications with transfers sit/stand, stair climbing and turning while on feet.  Soft knee brace is worn. Patient does not have there ex in place in order to exercise muscles supporting knee. ROM is functional but met with pain at end range of extension and flexion. Strength is slightly reduced bellow normal baselines. Patient will benefit from skilled PT in learning necessary there ex to improve mobility baselines, knee ROM and pain. Patient is motivated and agreeable with POC>     Plan   Treatment/Interventions: Education/ Instruction, Gait training, Manual therapy, Therapeutic activities, Therapeutic exercises  PT Plan: Skilled PT  PT Frequency:  (Recommended frequency; 1-2 sessions per week for total of 8 sessions)  Certification Period Start Date: 01/21/25  Certification Period End Date: 04/16/25  Rehab Potential: Good  Plan of Care Agreement: Patient    Current Problem  1. Primary osteoarthritis of both knees   Follow Up In Physical Therapy      2. Chronic pain of both knees  Follow Up In Physical Therapy      3. Right knee pain  Follow Up In Physical Therapy        Subjective   No pain this morning. Last time I was in PT I was aching afterwards and I needed Tylenol when I got home.     Precautions: NA     Vital Signs: NA     Pain: 0/10 - knees        Home Living: NA     Prior Function Per Pt/Caregiver Report: NA     Objective   Posture: WNL     Range of Motion: Right knee  0-120 AROM  0-125 PROM, with increased pain at end range of flexion and extension     Strength: Right Quads 4+/5  Right Hamstrings 4+/5  Right hip flexors 4/5  Right hip abductors 4+/5     Flexibility: NA     Palpation: tenderness present around right patella, medial and lateral knee joint line and lateral Hamstrings insertions      Special Tests:     Gait: normal priya on level floor  Two feet to each step climbing up stairs     Balance: fair     Bed Mobility: independent      Transfers: arm assist preferred, due to knee pain with WB  5 x sit/stand  = 18 seconds      Outcome Measures:  LEFS =  60/80    OP EDUCATION:     PT intervention;  Stepper x 8 min  Hook lying isometric hip adductions with 5 seconds holds   SLRS  Hook lying hip abductions with blue TB resistance  Seated Hamstrings curls with blue TB resistance; 3 x 10  Right Hamstrings stretch in sitting, with 30 seconds holds  Sit/stand transfers training with neutral spine and no arm assist     Access Code: [email or web address]  URL: [email or web address]  Date: 01/21/2025  Prepared by: Iris Mcclendon    Exercises  - Supine Quad Set  - 1 x daily - 7 x weekly - 3 sets - 10 reps  - Supine Heel Slide with Strap  - 1 x daily - 7 x weekly - 3 sets - 10 reps  - Seated Long Arc Quad  - 1 x daily - 7 x weekly - 3 sets - 10 reps  - Sit to Stand  - 1 x daily - 7 x weekly - 3 sets - 10 reps    Goals:  Active       PT Problem       PT Goal 1       Start:  01/21/25    Expected End:  04/16/25        Patient will be able to demonstrated and report improved functional ambulation with different daily tasks and/or recreational activities, as evident by LEFS of  66 or better           PT Goal 2       Start:  01/21/25    Expected End:  04/16/25       Patient will report reduced/abolished pain in right knee, indicated by grade of 0-2 on 0-10 pain scale          PT Goal 3       Start:  01/21/25    Expected End:  04/16/25       Full right knee ROM of 0-125, pain free at end range          PT Goal 4       Start:  01/21/25    Expected End:  04/16/25       Demo full and functional Quads and Gluteals strength of 5/5, evident by ease of walking, stair climbing and transferring sit/stand without arm assist

## 2025-03-06 ENCOUNTER — TREATMENT (OUTPATIENT)
Dept: PHYSICAL THERAPY | Facility: CLINIC | Age: 80
End: 2025-03-06
Payer: MEDICARE

## 2025-03-06 DIAGNOSIS — M25.561 CHRONIC PAIN OF BOTH KNEES: ICD-10-CM

## 2025-03-06 DIAGNOSIS — M25.561 RIGHT KNEE PAIN: ICD-10-CM

## 2025-03-06 DIAGNOSIS — M25.562 CHRONIC PAIN OF BOTH KNEES: ICD-10-CM

## 2025-03-06 DIAGNOSIS — M17.0 PRIMARY OSTEOARTHRITIS OF BOTH KNEES: ICD-10-CM

## 2025-03-06 DIAGNOSIS — G89.29 CHRONIC PAIN OF BOTH KNEES: ICD-10-CM

## 2025-03-06 PROCEDURE — 97110 THERAPEUTIC EXERCISES: CPT | Mod: GP,CQ

## 2025-03-06 ASSESSMENT — PAIN SCALES - GENERAL: PAINLEVEL_OUTOF10: 4

## 2025-03-06 ASSESSMENT — PAIN - FUNCTIONAL ASSESSMENT: PAIN_FUNCTIONAL_ASSESSMENT: 0-10

## 2025-03-06 NOTE — PROGRESS NOTES
Physical Therapy    Physical Therapy follow up    Patient Name: Kalyani Polk  MRN: [id number]  Today's Date: 3/6/2025  Time Entry:  Time Calculation  Start Time: 0930  Stop Time: 1015  Time Calculation (min): 45 min     PT Therapeutic Procedures Time Entry  Therapeutic Exercise Time Entry: 45                 Visit # 7  Insurance; [employer]  Cert; 1/25/2025 - 4/16/2025    Problem List Items Addressed This Visit             ICD-10-CM    Primary osteoarthritis of both knees M17.0    Chronic pain of both knees M25.561, M25.562, G89.29     Other Visit Diagnoses         Codes    Right knee pain     M25.561            Assessment  3/6/26  Responds well to today's given theres; Intermittent lateral and posterior knee discomfort, reported as tolerable overall. Balance therex added and completed well with intermittent hand taps. Bridge with hip abd challenging. Rest breaks as needed for patient.     2/27/2025;  Exercises modified to avoid right knee flare up. Patient responded well and able to transfer sit/stand without arm assist and without knee pain at end of session     Eval  Patient presents with chronic intermittent right knee pain, with primary cause being OA. Patient demo functional mobility in stairs and level floors but with difficulties due to knee pain. Patient demonstrating difficulties and modifications with transfers sit/stand, stair climbing and turning while on feet.  Soft knee brace is worn. Patient does not have there ex in place in order to exercise muscles supporting knee. ROM is functional but met with pain at end range of extension and flexion. Strength is slightly reduced bellow normal baselines. Patient will benefit from skilled PT in learning necessary there ex to improve mobility baselines, knee ROM and pain. Patient is motivated and agreeable with POC>     Plan   Treatment/Interventions: Education/ Instruction, Gait training, Manual therapy, Therapeutic activities, Therapeutic exercises  PT  Plan: Skilled PT  PT Frequency:  (Recommended frequency; 1-2 sessions per week for total of 8 sessions)  Certification Period Start Date: 01/21/25  Certification Period End Date: 04/16/25  Rehab Potential: Good  Plan of Care Agreement: Patient    Current Problem  1. Primary osteoarthritis of both knees  Follow Up In Physical Therapy      2. Chronic pain of both knees  Follow Up In Physical Therapy      3. Right knee pain  Follow Up In Physical Therapy        Subjective   All day aching yesterday, pain level of 4/10  Earlier this morning I had 0/10 pain, currently 2/10  Keeping busy moving and performing HEP daily    Precautions: NA     Vital Signs: NA     Pain: 0/10 - knees   Pain Assessment: 0-10  0-10 (Numeric) Pain Score: 4    Home Living: NA     Prior Function Per Pt/Caregiver Report: NA     Objective   Posture: WNL     Range of Motion: Right knee  0-120 AROM  0-125 PROM, with increased pain at end range of flexion and extension     Strength: Right Quads 4+/5  Right Hamstrings 4+/5  Right hip flexors 4/5  Right hip abductors 4+/5     Flexibility: NA     Palpation: tenderness present around right patella, medial and lateral knee joint line and lateral Hamstrings insertions      Special Tests:     Gait: normal priya on level floor  Two feet to each step climbing up stairs     Balance: fair     Bed Mobility: independent      Transfers: arm assist preferred, due to knee pain with WB  5 x sit/stand  = 18 seconds      Outcome Measures:  LEFS =  60/80    OP EDUCATION:     PT intervention;  Recumbent bike x 8 min   Lunges on 2nd step x 20  Right hamstring/knee extension stretch 3 x 30 seconds  Seated Hamstrings curls with blue TB resistance; 3 x 10  Hook lying hip abductions + bridge with blue TB resistance x 15  Hook lying isometric hip adductions with 5 seconds holds 15 x 5 sec  SLRS with quad set x 15  SLS on right in // bars 5 x ~5-7 seconds  Foam heel toe rocking without UE support x 15  Sit/stand transfers  training with neutral spine and no arm assist (not today)    Access Code: [email or web address]  URL: [email or web address]  Date: 01/21/2025  Prepared by: Iris Mcclendon    Exercises  - Supine Quad Set  - 1 x daily - 7 x weekly - 3 sets - 10 reps  - Supine Heel Slide with Strap  - 1 x daily - 7 x weekly - 3 sets - 10 reps  - Seated Long Arc Quad  - 1 x daily - 7 x weekly - 3 sets - 10 reps  - Sit to Stand  - 1 x daily - 7 x weekly - 3 sets - 10 reps    Goals:  Active       PT Problem       PT Goal 1       Start:  01/21/25    Expected End:  04/16/25       Patient will be able to demonstrated and report improved functional ambulation with different daily tasks and/or recreational activities, as evident by LEFS of  66 or better           PT Goal 2       Start:  01/21/25    Expected End:  04/16/25       Patient will report reduced/abolished pain in right knee, indicated by grade of 0-2 on 0-10 pain scale          PT Goal 3       Start:  01/21/25    Expected End:  04/16/25       Full right knee ROM of 0-125, pain free at end range          PT Goal 4       Start:  01/21/25    Expected End:  04/16/25       Demo full and functional Quads and Gluteals strength of 5/5, evident by ease of walking, stair climbing and transferring sit/stand without arm assist

## 2025-03-13 ENCOUNTER — TREATMENT (OUTPATIENT)
Dept: PHYSICAL THERAPY | Facility: CLINIC | Age: 80
End: 2025-03-13
Payer: MEDICARE

## 2025-03-13 DIAGNOSIS — M17.0 PRIMARY OSTEOARTHRITIS OF BOTH KNEES: ICD-10-CM

## 2025-03-13 DIAGNOSIS — M25.561 RIGHT KNEE PAIN: ICD-10-CM

## 2025-03-13 DIAGNOSIS — M25.562 CHRONIC PAIN OF BOTH KNEES: ICD-10-CM

## 2025-03-13 DIAGNOSIS — G89.29 CHRONIC PAIN OF BOTH KNEES: ICD-10-CM

## 2025-03-13 DIAGNOSIS — M25.561 CHRONIC PAIN OF BOTH KNEES: ICD-10-CM

## 2025-03-13 PROCEDURE — 97110 THERAPEUTIC EXERCISES: CPT | Mod: GP

## 2025-03-13 NOTE — PROGRESS NOTES
Physical Therapy    Physical Therapy follow up    Patient Name: Kalyani Polk  MRN: [id number]  Today's Date: 3/13/2025  Time Entry:  Time Calculation  Start Time: 0930  Stop Time: 1015  Time Calculation (min): 45 min     PT Therapeutic Procedures Time Entry  Therapeutic Exercise Time Entry: 45                 Visit # 8  Insurance; [employer]  Cert; 1/25/2025 - 4/16/2025    Problem List Items Addressed This Visit             ICD-10-CM       Musculoskeletal and Injuries    Primary osteoarthritis of both knees M17.0    Chronic pain of both knees M25.561, M25.562, G89.29     Other Visit Diagnoses         Codes    Right knee pain     M25.561            Assessment  3/6/26  Responds well to today's given theres; Intermittent lateral and posterior knee discomfort, reported as tolerable overall. Balance therex added and completed well with intermittent hand taps. Bridge with hip abd challenging. Rest breaks as needed for patient.   3/13/2025; Tactile and verbal cues still needed. Mild lateral knee ache abolished through session. Lower back stretches emphasized with increased frequency of flexions and lateral flexion. Next two sessions scheduled with goal to established full independence with HEP    2/27/2025;  Exercises modified to avoid right knee flare up. Patient responded well and able to transfer sit/stand without arm assist and without knee pain at end of session     Eval  Patient presents with chronic intermittent right knee pain, with primary cause being OA. Patient demo functional mobility in stairs and level floors but with difficulties due to knee pain. Patient demonstrating difficulties and modifications with transfers sit/stand, stair climbing and turning while on feet.  Soft knee brace is worn. Patient does not have there ex in place in order to exercise muscles supporting knee. ROM is functional but met with pain at end range of extension and flexion. Strength is slightly reduced bellow normal  baselines. Patient will benefit from skilled PT in learning necessary there ex to improve mobility baselines, knee ROM and pain. Patient is motivated and agreeable with POC>     Plan   Treatment/Interventions: Education/ Instruction, Gait training, Manual therapy, Therapeutic activities, Therapeutic exercises  PT Plan: Skilled PT  PT Frequency:  (Recommended frequency; 1-2 sessions per week for total of 8 sessions)  Certification Period Start Date: 01/21/25  Certification Period End Date: 04/16/25  Rehab Potential: Good  Plan of Care Agreement: Patient    Current Problem  1. Primary osteoarthritis of both knees  Follow Up In Physical Therapy      2. Chronic pain of both knees  Follow Up In Physical Therapy      3. Right knee pain  Follow Up In Physical Therapy        Subjective   Dealing with same and usual ache on the outside of right knee. No major change to report. Wondering if it will ever get all the way better. Main lateral and lower leg pain present while driving     Precautions: NA     Vital Signs: NA     Pain: 3/10 -  right knees        Home Living: NA     Prior Function Per Pt/Caregiver Report: NA     Objective   Posture: WNL     Range of Motion: Right knee  0-120 AROM  0-125 PROM, with increased pain at end range of flexion and extension     Strength: Right Quads 4+/5  Right Hamstrings 4+/5  Right hip flexors 4/5  Right hip abductors 4+/5     Flexibility: NA     Palpation: tenderness present around right patella, medial and lateral knee joint line and lateral Hamstrings insertions      Special Tests:     Gait: normal priya on level floor  Two feet to each step climbing up stairs     Balance: fair     Bed Mobility: independent      Transfers: arm assist preferred, due to knee pain with WB  5 x sit/stand  = 18 seconds      Outcome Measures:  LEFS =  60/80    OP EDUCATION:     PT intervention;  Recumbent bike x 8 min   Seated repeated lumbar flexions from sitting position x 10 reps  Sit/stand from chair  (no arm assist);  Lunges on 2nd step x 20  Right hamstring/knee extension stretch 3 x 30 seconds  Sitting flexion with rotation to left and pulling toward left knee  Added short 4 inch step ups with right; 2 x 10  Standing hip glides to right with ITB stretch  Standing trunk rotation to right; hand tapping wall  Hook lying hip abductions + bridge with blue TB resistance x 15 - HEP  Hook lying isometric hip adductions with 5 seconds holds 15 x 5 sec - HEP  SLRS with quad set x 15 -HEP  SLS on right in // bars 5 x ~5-7 seconds - HEP  Foam heel toe rocking without UE support x 15  Sit/stand transfers training with neutral spine and no arm assist (not today)    Access Code: [email or web address]  URL: [email or web address]  Date: 01/21/2025  Prepared by: Iris Mcclendon    Exercises  - Supine Quad Set  - 1 x daily - 7 x weekly - 3 sets - 10 reps  - Supine Heel Slide with Strap  - 1 x daily - 7 x weekly - 3 sets - 10 reps  - Seated Long Arc Quad  - 1 x daily - 7 x weekly - 3 sets - 10 reps  - Sit to Stand  - 1 x daily - 7 x weekly - 3 sets - 10 reps    Goals:  Active       PT Problem       PT Goal 1       Start:  01/21/25    Expected End:  04/16/25       Patient will be able to demonstrated and report improved functional ambulation with different daily tasks and/or recreational activities, as evident by LEFS of  66 or better           PT Goal 2       Start:  01/21/25    Expected End:  04/16/25       Patient will report reduced/abolished pain in right knee, indicated by grade of 0-2 on 0-10 pain scale          PT Goal 3       Start:  01/21/25    Expected End:  04/16/25       Full right knee ROM of 0-125, pain free at end range          PT Goal 4       Start:  01/21/25    Expected End:  04/16/25       Demo full and functional Quads and Gluteals strength of 5/5, evident by ease of walking, stair climbing and transferring sit/stand without arm assist

## 2025-03-20 ENCOUNTER — TREATMENT (OUTPATIENT)
Dept: PHYSICAL THERAPY | Facility: CLINIC | Age: 80
End: 2025-03-20
Payer: MEDICARE

## 2025-03-20 DIAGNOSIS — M17.0 PRIMARY OSTEOARTHRITIS OF BOTH KNEES: ICD-10-CM

## 2025-03-20 DIAGNOSIS — G89.29 CHRONIC PAIN OF BOTH KNEES: ICD-10-CM

## 2025-03-20 DIAGNOSIS — M25.562 CHRONIC PAIN OF BOTH KNEES: ICD-10-CM

## 2025-03-20 DIAGNOSIS — M25.561 CHRONIC PAIN OF BOTH KNEES: ICD-10-CM

## 2025-03-20 DIAGNOSIS — M25.561 RIGHT KNEE PAIN: Primary | ICD-10-CM

## 2025-03-20 PROCEDURE — 97110 THERAPEUTIC EXERCISES: CPT | Mod: GP

## 2025-03-20 NOTE — PROGRESS NOTES
Physical Therapy    Physical Therapy follow up    Patient Name: Kalyani Polk  MRN: [id number]  Today's Date: 3/20/2025  Time Entry:  Time Calculation  Start Time: 0930  Stop Time: 1015  Time Calculation (min): 45 min     PT Therapeutic Procedures Time Entry  Therapeutic Exercise Time Entry: 45                 Visit # 9  Insurance; [employer]  Cert; 1/25/2025 - 4/16/2025    Problem List Items Addressed This Visit             ICD-10-CM       Musculoskeletal and Injuries    Primary osteoarthritis of both knees M17.0    Right knee pain - Primary M25.561       Assessment  3/6/26  Responds well to today's given theres; Intermittent lateral and posterior knee discomfort, reported as tolerable overall. Balance therex added and completed well with intermittent hand taps. Bridge with hip abd challenging. Rest breaks as needed for patient.   3/13/2025; Tactile and verbal cues still needed. Mild lateral knee ache abolished through session. Lower back stretches emphasized with increased frequency of flexions and lateral flexion. Next two sessions scheduled with goal to established full independence with HEP  3/20/2025;  Patient ready to maintain instructed HEP and DC as of today.         Eval  Patient presents with chronic intermittent right knee pain, with primary cause being OA. Patient demo functional mobility in stairs and level floors but with difficulties due to knee pain. Patient demonstrating difficulties and modifications with transfers sit/stand, stair climbing and turning while on feet.  Soft knee brace is worn. Patient does not have there ex in place in order to exercise muscles supporting knee. ROM is functional but met with pain at end range of extension and flexion. Strength is slightly reduced bellow normal baselines. Patient will benefit from skilled PT in learning necessary there ex to improve mobility baselines, knee ROM and pain. Patient is motivated and agreeable with POC>     Plan    Treatment/Interventions: Education/ Instruction, Gait training, Manual therapy, Therapeutic activities, Therapeutic exercises  PT Plan: Skilled PT  PT Frequency:  (Recommended frequency; 1-2 sessions per week for total of 8 sessions)  Certification Period Start Date: 01/21/25  Certification Period End Date: 04/16/25  Rehab Potential: Good  Plan of Care Agreement: Patient    Current Problem  1. Right knee pain  Follow Up In Physical Therapy      2. Primary osteoarthritis of both knees  Follow Up In Physical Therapy      3. Chronic pain of both knees  Follow Up In Physical Therapy        Subjective   Right knee hurts today and it did last night, probably due to coming rain. Scheduled to see ortho on 4/4/2025. Okay with today being last PT session. No issues with instructed HEP. Able to stand up from sitting better, but after 1 hour of sitting my knee gets stiff and its hard again   Precautions: NA     Vital Signs: NA     Pain: 4/10 -  right knees        Home Living: NA     Prior Function Per Pt/Caregiver Report: NA     Objective   Posture: WNL     Range of Motion: Right knee  0-120 AROM  0-125 PROM, with increased pain at end range of flexion and extension     Strength: Right Quads 5-/5  Right Hamstrings 5-/5  Right hip flexors 5/5  Right hip abductors 5/5     Flexibility: NA     Palpation: tenderness present around right patella, medial and lateral knee joint line and lateral Hamstrings insertions      Special Tests:     Gait: normal priya on level floor  Two feet to each step climbing up stairs     Balance: fair     Bed Mobility: independent      Transfers: arm assist preferred, due to knee pain with WB  5 x sit/stand  = 18 seconds      Outcome Measures:  LEFS =  60/80    OP EDUCATION:     PT intervention;  Recumbent bike x 8 min   Seated repeated lumbar flexions from sitting position x 10 reps  Sit/stand from chair (no arm assist);  Lunges on 2nd step x 20  Right hamstring/knee extension stretch 3 x 30  seconds  Sitting flexion with rotation to left and pulling toward left knee  Added short 4 inch step ups with right; 3 x 10  Standing hip glides to right with ITB stretch  Standing trunk rotation to right; hand tapping wall  Hook lying hip abductions + bridge with blue TB resistance x 15 - HEP  Hook lying isometric hip adductions with 5 seconds holds 15 x 5 sec - HEP  SLRS with quad set x 15 -HEP  SLS on right in // bars 5 x ~5-7 seconds - HEP  Foam heel toe rocking without UE support x 15  Sit/stand transfers training with neutral spine and no arm assist (not today)    Access Code: [email or web address]  URL: [email or web address]  Date: 01/21/2025  Prepared by: Iris Mcclendon    Exercises  - Supine Quad Set  - 1 x daily - 7 x weekly - 3 sets - 10 reps  - Supine Heel Slide with Strap  - 1 x daily - 7 x weekly - 3 sets - 10 reps  - Seated Long Arc Quad  - 1 x daily - 7 x weekly - 3 sets - 10 reps  - Sit to Stand  - 1 x daily - 7 x weekly - 3 sets - 10 reps    Goals:  Active       PT Problem       PT Goal 1       Start:  01/21/25    Expected End:  04/16/25       Patient will be able to demonstrated and report improved functional ambulation with different daily tasks and/or recreational activities, as evident by LEFS of  66 or better           PT Goal 2       Start:  01/21/25    Expected End:  04/16/25       Patient will report reduced/abolished pain in right knee, indicated by grade of 0-2 on 0-10 pain scale          PT Goal 3       Start:  01/21/25    Expected End:  04/16/25       Full right knee ROM of 0-125, pain free at end range          PT Goal 4       Start:  01/21/25    Expected End:  04/16/25       Demo full and functional Quads and Gluteals strength of 5/5, evident by ease of walking, stair climbing and transferring sit/stand without arm assist

## 2025-03-24 ENCOUNTER — APPOINTMENT (OUTPATIENT)
Dept: PRIMARY CARE | Facility: CLINIC | Age: 80
End: 2025-03-24
Payer: MEDICARE

## 2025-03-25 ENCOUNTER — OFFICE VISIT (OUTPATIENT)
Dept: PRIMARY CARE | Facility: CLINIC | Age: 80
End: 2025-03-25
Payer: MEDICARE

## 2025-03-25 VITALS
HEART RATE: 80 BPM | HEIGHT: 63 IN | SYSTOLIC BLOOD PRESSURE: 112 MMHG | DIASTOLIC BLOOD PRESSURE: 60 MMHG | BODY MASS INDEX: 29.8 KG/M2 | WEIGHT: 168.2 LBS | OXYGEN SATURATION: 96 % | TEMPERATURE: 97.3 F

## 2025-03-25 DIAGNOSIS — N18.31 STAGE 3A CHRONIC KIDNEY DISEASE (MULTI): ICD-10-CM

## 2025-03-25 DIAGNOSIS — I10 BENIGN ESSENTIAL HYPERTENSION: ICD-10-CM

## 2025-03-25 DIAGNOSIS — E04.2 MULTIPLE THYROID NODULES: Primary | ICD-10-CM

## 2025-03-25 PROCEDURE — 1159F MED LIST DOCD IN RCRD: CPT | Performed by: FAMILY MEDICINE

## 2025-03-25 PROCEDURE — 1123F ACP DISCUSS/DSCN MKR DOCD: CPT | Performed by: FAMILY MEDICINE

## 2025-03-25 PROCEDURE — 1160F RVW MEDS BY RX/DR IN RCRD: CPT | Performed by: FAMILY MEDICINE

## 2025-03-25 PROCEDURE — 3078F DIAST BP <80 MM HG: CPT | Performed by: FAMILY MEDICINE

## 2025-03-25 PROCEDURE — 99213 OFFICE O/P EST LOW 20 MIN: CPT | Performed by: FAMILY MEDICINE

## 2025-03-25 PROCEDURE — 3074F SYST BP LT 130 MM HG: CPT | Performed by: FAMILY MEDICINE

## 2025-03-25 ASSESSMENT — ENCOUNTER SYMPTOMS
DEPRESSION: 0
OCCASIONAL FEELINGS OF UNSTEADINESS: 0
LOSS OF SENSATION IN FEET: 0

## 2025-03-25 NOTE — PATIENT INSTRUCTIONS
BP well controlled.  Continue amlodipine 10 mg daily.      For cholesterol, taking atorvastatin 10 mg.  Will check fasting lipids today.    Ongoing right lower leg swelling.  Ultrasound negative for clot.  Use compression stockings, elevated when able.    Follow up thyroid nodules on ultrasound.  Due to recheck, order in.    Immunizations needed:  Shingrix for shingles.  RSV once

## 2025-03-25 NOTE — PROGRESS NOTES
"Subjective   Patient ID: Kalyani Polk is a 79 y.o. female who presents for Leg Swelling.  Patient presents for follow up, with couple questions.  Still swelling in feet, wonders if it could be from gabapentin.  Right is more than left.  Had ultrasound of right leg, and it was negative for DVT.  Elevation helps.  Compression helps, but doesn't always wear the stockings.  Arthritis in right knee.  Will be seeing orthopedist for another injection to help with the pain.  Is taking gabapentin, which has helped with the crawling sensation she was having in her left leg.    Thyroid ultrasound done feb 2024 with several nodules.  Recommended recheck in 1 year.  TSH done in the fall was normal.    Has questions about whether she needs any vaccines.  Does have appointment to get Shingrix at the pharmacy with .        Review of Systems    Objective   /60 (BP Location: Right arm, Patient Position: Sitting, BP Cuff Size: Adult)   Pulse 80   Temp 36.3 °C (97.3 °F) (Temporal)   Ht 1.6 m (5' 3\")   Wt 76.3 kg (168 lb 3.2 oz)   SpO2 96%   BMI 29.80 kg/m²     Physical Exam  Vitals reviewed.   Constitutional:       Appearance: Normal appearance.   Cardiovascular:      Rate and Rhythm: Normal rate and regular rhythm.      Pulses:           Dorsalis pedis pulses are 2+ on the right side and 2+ on the left side.      Heart sounds: No murmur heard.  Pulmonary:      Effort: Pulmonary effort is normal.      Breath sounds: Normal breath sounds.   Musculoskeletal:      Right lower leg: No edema.      Left lower leg: No edema.   Neurological:      Mental Status: She is alert.   Psychiatric:         Mood and Affect: Mood normal.         Behavior: Behavior normal.           Assessment/Plan   Problem List Items Addressed This Visit       Benign essential hypertension    Stage 3a chronic kidney disease (Multi)     Other Visit Diagnoses       Multiple thyroid nodules    -  Primary    Relevant Orders    US thyroid    Body mass index " (BMI) of 29.0 to 29.9 in adult

## 2025-03-27 ENCOUNTER — APPOINTMENT (OUTPATIENT)
Dept: PHYSICAL THERAPY | Facility: CLINIC | Age: 80
End: 2025-03-27
Payer: MEDICARE

## 2025-03-29 DIAGNOSIS — K21.9 GASTROESOPHAGEAL REFLUX DISEASE WITHOUT ESOPHAGITIS: ICD-10-CM

## 2025-03-29 RX ORDER — PANTOPRAZOLE SODIUM 40 MG/1
40 TABLET, DELAYED RELEASE ORAL DAILY
Qty: 90 TABLET | Refills: 0 | Status: SHIPPED | OUTPATIENT
Start: 2025-03-29

## 2025-04-04 ENCOUNTER — OFFICE VISIT (OUTPATIENT)
Dept: ORTHOPEDIC SURGERY | Facility: HOSPITAL | Age: 80
End: 2025-04-04
Payer: MEDICARE

## 2025-04-04 ENCOUNTER — HOSPITAL ENCOUNTER (OUTPATIENT)
Dept: RADIOLOGY | Facility: EXTERNAL LOCATION | Age: 80
Discharge: HOME | End: 2025-04-04

## 2025-04-04 DIAGNOSIS — M76.31 IT BAND SYNDROME, RIGHT: ICD-10-CM

## 2025-04-04 DIAGNOSIS — M25.562 CHRONIC PAIN OF BOTH KNEES: ICD-10-CM

## 2025-04-04 DIAGNOSIS — M17.11 PRIMARY OSTEOARTHRITIS OF RIGHT KNEE: Primary | ICD-10-CM

## 2025-04-04 DIAGNOSIS — M25.561 CHRONIC PAIN OF BOTH KNEES: ICD-10-CM

## 2025-04-04 DIAGNOSIS — G89.29 CHRONIC PAIN OF BOTH KNEES: ICD-10-CM

## 2025-04-04 DIAGNOSIS — M76.811 ANTERIOR TIBIALIS TENDINITIS OF RIGHT LOWER EXTREMITY: ICD-10-CM

## 2025-04-04 DIAGNOSIS — M17.0 PRIMARY OSTEOARTHRITIS OF BOTH KNEES: ICD-10-CM

## 2025-04-04 PROCEDURE — 99214 OFFICE O/P EST MOD 30 MIN: CPT | Performed by: STUDENT IN AN ORGANIZED HEALTH CARE EDUCATION/TRAINING PROGRAM

## 2025-04-04 PROCEDURE — 20611 DRAIN/INJ JOINT/BURSA W/US: CPT | Mod: RT | Performed by: STUDENT IN AN ORGANIZED HEALTH CARE EDUCATION/TRAINING PROGRAM

## 2025-04-04 PROCEDURE — 2500000004 HC RX 250 GENERAL PHARMACY W/ HCPCS (ALT 636 FOR OP/ED): Performed by: STUDENT IN AN ORGANIZED HEALTH CARE EDUCATION/TRAINING PROGRAM

## 2025-04-04 PROCEDURE — 1123F ACP DISCUSS/DSCN MKR DOCD: CPT | Performed by: STUDENT IN AN ORGANIZED HEALTH CARE EDUCATION/TRAINING PROGRAM

## 2025-04-04 PROCEDURE — 99214 OFFICE O/P EST MOD 30 MIN: CPT | Mod: 25 | Performed by: STUDENT IN AN ORGANIZED HEALTH CARE EDUCATION/TRAINING PROGRAM

## 2025-04-04 RX ADMIN — LIDOCAINE HYDROCHLORIDE 2 ML: 10 INJECTION, SOLUTION INFILTRATION; PERINEURAL at 13:58

## 2025-04-04 RX ADMIN — ROPIVACAINE HYDROCHLORIDE 2 ML: 5 INJECTION EPIDURAL; INFILTRATION; PERINEURAL at 13:58

## 2025-04-04 RX ADMIN — METHYLPREDNISOLONE ACETATE 80 MG: 40 INJECTION, SUSPENSION INTRA-ARTICULAR; INTRALESIONAL; INTRAMUSCULAR; INTRASYNOVIAL; SOFT TISSUE at 13:58

## 2025-04-04 NOTE — PROGRESS NOTES
Sports Medicine Office Note    Today's Date:  04/04/2025     HPI: Kalyani Polk is a 80 y.o. female who presents today for follow-up of bilateral knee pain.     12/10/2024: She states her pain started roughly 3-4 months ago with insidious onset and no associated injury/trauma.  States pain is worse in the right knee compared to the left, worse with standing/walking for longer durations.  States she has noticed mild swelling of her right knee but denies any redness, warmth, bruising.  States pain is throughout the entire right knee without any radiation of pain, numbness, tingling, weakness.  She has tried Tylenol, Voltaren gel, icing all with minimal to no improvement.  She has also been prescribed hydrocodone for recent carpal tunnel surgery, but states this does not seem to help much with her knee pain.  She denies any history of surgeries to either knee.      2/4/2025: She presents today for follow-up of chronic bilateral knee pain s/p right knee joint cortisone injection on 12/10/2024.  She states she got minimal relief after this cortisone injection.  Denies any interval injury/trauma or any new redness, warmth, bruising, numbness, tingling, or weakness.  States she still has mild swelling over her right knee without any acute worsening.  She states her pain is primarily over the lateral aspect of the right knee with proximal and distal radiation up towards the hip and down to the ankle.  She has been using OTC analgesics/anti-inflammatories without significant improvement.  She has been following with PT regularly and doing home exercises in addition to using a Econo hinged knee brace which does seem to help with her overall knee stability.  She states she still has left knee pain, though this is less severe than pain in her right knee.    4/5/2025: She presents today for follow-up of chronic bilateral knee pain, right worse than left.  She has been following with PT regularly and taking OTC  analgesics/anti-inflammatories in addition to following home exercises, but states she has not had any significant improvement since previous evaluation.  States pain is still primarily over the lateral aspect of the right knee, though pain is throughout the knee joint with mild associated chronic swelling.  Denies any new redness, warmth, bruising, numbness, tingling, or weakness.  She is interested in trial of viscosupplementation injections if appropriate.    She has no other complaints.    Physical Examination:     The RIGHT knee has grade 1-2 joint effusion. Patella crepitus and grind are positive. There is tenderness to the medial and lateral joint lines. Flexion and extension are without mechanical blocking.  Valgus and varus stress testing at 0 and 30 degrees of flexion elicited medial and lateral joint line laxity with associated pain that improved with return to neutral position.  Otherwise, there is no instability with stress testing.  There is tenderness to palpation over distal IT band.  Positive Roseline's.  There is tenderness to palpation over right tibialis anterior muscle/distal tendon and pain with resisted dorsiflexion.  The LEFT knee has trace to grade 1 joint effusion. Patella crepitus and grind are positive. There is minimal tenderness to the medial and lateral joint lines. Flexion and extension are without mechanical blocking. There is no instability with stress testing.  Skin - no rashes, sores, or open lesions. Strength, sensory and vascular exams are otherwise normal. There is no clubbing, cyanosis or edema.  Gait is slightly antalgic and tandem.     Imaging:  Radiographs of bilateral knees obtained 11/19/2024 were reviewed and revealed mild to moderate tricompartmental DJD with chondrocalcinosis, otherwise no acute osseous abnormalities.     The studies were reviewed by me personally in the office today.    Problem List Items Addressed This Visit             ICD-10-CM    Primary  osteoarthritis of both knees M17.0     Other Visit Diagnoses         Codes    Primary osteoarthritis of right knee    -  Primary M17.11    Relevant Orders    Point of Care Ultrasound (Completed)    L Inj/Asp: R knee    Chronic pain of both knees     M25.561, M25.562, G89.29    Anterior tibialis tendinitis of right lower extremity     M76.811    It band syndrome, right     M76.31          Procedure Note:  After consent was obtained, the right knee was prepped in a sterile fashion. Ultrasound guidance was used to help insure proper needle placement into the  joint , decrease patient discomfort, and decrease collateral damage. The joint was visualized and Depo-Medrol 80 mg with lidocaine 1% 2 mL & ropivacaine 0.5% 2 mL were injected without any complications. Ultrasound images were saved on an internal file for later reference. The patient tolerated the procedure well and the area was cleaned and bandaged.  Patient ID: Kalyani Polk is a 80 y.o. female.    L Inj/Asp: R knee on 4/4/2025 1:58 PM  Indications: pain  Details: 18 G needle, ultrasound-guided superolateral approach  Medications: 2 mL lidocaine 10 mg/mL (1 %); 80 mg methylPREDNISolone acetate 40 mg/mL; 2 mL ropivacaine 5 mg/mL (0.5 %)  Aspirate: 10 mL yellow  Outcome: tolerated well, no immediate complications  Procedure, treatment alternatives, risks and benefits explained, specific risks discussed. Consent was given by the patient. Immediately prior to procedure a time out was called to verify the correct patient, procedure, equipment, support staff and site/side marked as required. Patient was prepped and draped in the usual sterile fashion.         Assessment and Plan:    Most recent  Radiographs of the RIGHT knee were reviewed and revealed degenerative joint changes.  Patient has failed conservative management as outlined below:  Continued pain 6 weeks after last steroid injection. Last steroid injection done on 12/10/2024  Continued pain after 6 weeks with  anti-inflammatory and/or analgesic medication(s). Dosage and daily intake: Ibuprofen 600-800 mg three times daily (or alternative NSAID equivalent), Acetaminophen 1,000 mg three times daily, and Topical diclofenac applied three times daily  Continued pain after 6 weeks with physician-directed daily activity modification and/or physical therapy    Patient meets at least five (5) of the ACR clinical/laboratory criteria listed below:  Bony enlargement, Bony tenderness, Crepitus on active motion, No palpable warmth of synovium, and > 50 years of age    Patient is NOT scheduled to undergo total knee replacement within 6 months of treatment.     We reviewed the exam and imaging findings and discussed the conservative and surgical treatment options. We agreed to a repeat hopefully therapeutic cortisone injection into the right knee joint, though we will submit for insurance approval of viscosupplementation injections as she has failed all other conservative management strategies including cortisone injections, but hopeful that today's injection will buy some time until able to perform viscosupplementation injection once approved through insurance.   She tolerated this well. Activity modifications were reviewed.  Encouraged her to continue with regular home exercise program as tolerated.  Recommended prescription doses of Tylenol, Voltaren gel, and encouraged use of ice or heat as needed for acute flares of pain.  She may continue to use Econo hinged knee brace for right knee instability/pain to be used with daily activity and exercise for added stability/comfort.  Plan for follow-up if/when viscosupplementation injections approved through insurance, otherwise may follow-up sooner if any new concerns arise.  Discussed this plan with the patient who is understanding and agreeable.    **This note was dictated using Dragon speech recognition software and was not corrected for spelling or grammatical errors**.    Armani Munson,  DO  Primary Care Sports Medicine  [facility] Sports Medicine [place]

## 2025-04-05 RX ORDER — LIDOCAINE HYDROCHLORIDE 10 MG/ML
2 INJECTION, SOLUTION INFILTRATION; PERINEURAL
Status: COMPLETED | OUTPATIENT
Start: 2025-04-04 | End: 2025-04-04

## 2025-04-05 RX ORDER — METHYLPREDNISOLONE ACETATE 40 MG/ML
80 INJECTION, SUSPENSION INTRA-ARTICULAR; INTRALESIONAL; INTRAMUSCULAR; SOFT TISSUE
Status: COMPLETED | OUTPATIENT
Start: 2025-04-04 | End: 2025-04-04

## 2025-04-05 RX ORDER — ROPIVACAINE HYDROCHLORIDE 5 MG/ML
2 INJECTION, SOLUTION EPIDURAL; INFILTRATION; PERINEURAL
Status: COMPLETED | OUTPATIENT
Start: 2025-04-04 | End: 2025-04-04

## 2025-04-05 NOTE — PROGRESS NOTES
Sports Medicine Office Note    Today's Date:  02/04/2025     HPI: Kalyani Polk is a 80 y.o. female who presents today for follow-up of bilateral knee pain.    12/10/2024: She states her pain started roughly 3-4 months ago with insidious onset and no associated injury/trauma.  States pain is worse in the right knee compared to the left, worse with standing/walking for longer durations.  States she has noticed mild swelling of her right knee but denies any redness, warmth, bruising.  States pain is throughout the entire right knee without any radiation of pain, numbness, tingling, weakness.  She has tried Tylenol, Voltaren gel, icing all with minimal to no improvement.  She has also been prescribed hydrocodone for recent carpal tunnel surgery, but states this does not seem to help much with her knee pain.  She denies any history of surgeries to either knee.     2/4/2025: She presents today for follow-up of chronic bilateral knee pain s/p right knee joint cortisone injection on 12/10/2024.  She states she got minimal relief after this cortisone injection.  Denies any interval injury/trauma or any new redness, warmth, bruising, numbness, tingling, or weakness.  States she still has mild swelling over her right knee without any acute worsening.  She states her pain is primarily over the lateral aspect of the right knee with proximal and distal radiation up towards the hip and down to the ankle.  She has been using OTC analgesics/anti-inflammatories without significant improvement.  She has been following with PT regularly and doing home exercises in addition to using a Econo hinged knee brace which does seem to help with her overall knee stability.  She states she still has left knee pain, though this is less severe than pain in her right knee.    She has no other complaints.    Physical Examination:     The RIGHT knee has grade 1-2 joint effusion. Patella crepitus and grind are positive. There is tenderness to the medial  and lateral joint lines. Flexion and extension are without mechanical blocking.  Valgus and varus stress testing at 0 and 30 degrees of flexion elicited medial and lateral joint line laxity with associated pain that improved with return to neutral position.  Otherwise, there is no instability with stress testing.  There is tenderness to palpation over distal IT band.  Positive Roseline's.  There is tenderness to palpation over right tibialis anterior muscle/distal tendon and pain with resisted dorsiflexion.  The LEFT knee has trace to grade 1 joint effusion. Patella crepitus and grind are positive. There is minimal tenderness to the medial and lateral joint lines. Flexion and extension are without mechanical blocking. There is no instability with stress testing.  Skin - no rashes, sores, or open lesions. Strength, sensory and vascular exams are otherwise normal. There is no clubbing, cyanosis or edema.  Gait is slightly antalgic and tandem.     Imaging:  Radiographs of bilateral knees obtained 11/19/2024 were reviewed and revealed mild to moderate tricompartmental DJD with chondrocalcinosis, otherwise no acute osseous abnormalities.     The studies were reviewed by me personally in the office today.    Problem List Items Addressed This Visit             ICD-10-CM    Primary osteoarthritis of both knees - Primary M17.0     Other Visit Diagnoses         Codes    It band syndrome, right     M76.31    Relevant Orders    Referral to Physical Therapy    Anterior tibialis tendinitis of right lower extremity     M76.811    Relevant Orders    Referral to Physical Therapy          Assessment and Plan:    We reviewed the exam and imaging findings and discussed the conservative and surgical treatment options. We agreed to continue with conservative management for chronic bilateral knee pain, right worse than left, and provided new referral to physical therapy to also address IT band tendinitis and tibialis anterior tendinitis as  her current pain is more consistent with tendinitis, though does have some pain associated with OA.  Recommended prescription doses of Tylenol, Voltaren gel, and encouraged use of ice or heat as needed for acute flares of pain.  She should continue use of Econo hinged knee brace for right knee instability/pain to be used with daily activity and exercise.  Plan for follow-up in 2 months for re-evaluation, otherwise may follow-up sooner if any new concerns arise.  Consider viscosupplementation injection if no significant improvement with further physical therapy.  Discussed this plan with the patient who is understanding and agreeable.    **This note was dictated using Dragon speech recognition software and was not corrected for spelling or grammatical errors**.    Armani Munson DO  Primary Care Sports Medicine  [facility] Sports Medicine [place]

## 2025-04-08 ENCOUNTER — HOSPITAL ENCOUNTER (OUTPATIENT)
Dept: RADIOLOGY | Facility: CLINIC | Age: 80
Discharge: HOME | End: 2025-04-08
Payer: MEDICARE

## 2025-04-08 DIAGNOSIS — E04.2 MULTIPLE THYROID NODULES: ICD-10-CM

## 2025-04-08 PROCEDURE — 76536 US EXAM OF HEAD AND NECK: CPT

## 2025-04-08 PROCEDURE — 76536 US EXAM OF HEAD AND NECK: CPT | Performed by: RADIOLOGY

## 2025-04-11 NOTE — RESULT ENCOUNTER NOTE
The thyroid ultrasound several nodules.  Two of them meet criteria to be biopsied.    I can put order in for that to be done.  Let me know if she has any questions.

## 2025-04-15 DIAGNOSIS — E04.2 MULTIPLE THYROID NODULES: Primary | ICD-10-CM

## 2025-04-16 ENCOUNTER — TELEPHONE (OUTPATIENT)
Dept: PRIMARY CARE | Facility: CLINIC | Age: 80
End: 2025-04-16
Payer: MEDICARE

## 2025-04-16 NOTE — TELEPHONE ENCOUNTER
Spoke with pt, gave her the number for central scheduling.    ----- Message from Ebony GREEN sent at 4/16/2025  4:16 PM EDT -----  This is an interventional radiology visit, so it has to be scheduled through central scheduling/the radiology department  ----- Message -----  From: Rain Piper Douglas  Sent: 4/16/2025   4:03 PM EDT  To: Ebony Doherty    Is this something you could help her set up, or is someone else supposed to contact her?  ----- Message -----  From: Marilyn Hernandez MD  Sent: 4/15/2025  10:41 AM EDT  To: Rain Piper Douglas    Order put in for biopsy.  I believe someone should contact her regarding setting it up.  ----- Message -----  From: Rain Piper Douglas  Sent: 4/15/2025   8:59 AM EDT  To: Marilyn Hernandez MD    Spoke with pt, informed of message below. Verbalized understanding. Stated you can put the biopsy order in.  ----- Message -----  From: Marilyn Hernandez MD  Sent: 4/10/2025   9:42 PM EDT  To: [id number] Clinical Support Staff    The thyroid ultrasound several nodules.  Two of them meet criteria to be biopsied.    I can put order in for that to be done.  Let me know if she has any questions.

## 2025-04-30 DIAGNOSIS — M17.0 PRIMARY OSTEOARTHRITIS OF BOTH KNEES: ICD-10-CM

## 2025-04-30 RX ORDER — HYLAN G-F 20 16MG/2ML
SYRINGE (ML) INTRAARTICULAR
Qty: 12 ML | Refills: 0 | Status: SHIPPED | OUTPATIENT
Start: 2025-04-30

## 2025-05-01 ENCOUNTER — HOSPITAL ENCOUNTER (OUTPATIENT)
Dept: RADIOLOGY | Facility: HOSPITAL | Age: 80
Discharge: HOME | End: 2025-05-01
Payer: MEDICARE

## 2025-05-01 VITALS
SYSTOLIC BLOOD PRESSURE: 143 MMHG | HEART RATE: 99 BPM | DIASTOLIC BLOOD PRESSURE: 70 MMHG | RESPIRATION RATE: 18 BRPM | OXYGEN SATURATION: 100 %

## 2025-05-01 DIAGNOSIS — E04.2 MULTIPLE THYROID NODULES: ICD-10-CM

## 2025-05-01 DIAGNOSIS — J31.0 CHRONIC RHINITIS: ICD-10-CM

## 2025-05-01 PROCEDURE — 10006 FNA BX W/US GDN EA ADDL: CPT

## 2025-05-01 PROCEDURE — 10005 FNA BX W/US GDN 1ST LES: CPT

## 2025-05-01 PROCEDURE — 2500000004 HC RX 250 GENERAL PHARMACY W/ HCPCS (ALT 636 FOR OP/ED)

## 2025-05-01 RX ORDER — LEVOCETIRIZINE DIHYDROCHLORIDE 5 MG/1
5 TABLET, FILM COATED ORAL EVERY EVENING
Qty: 90 TABLET | Refills: 2 | Status: SHIPPED | OUTPATIENT
Start: 2025-05-01

## 2025-05-01 RX ORDER — LIDOCAINE HYDROCHLORIDE 10 MG/ML
INJECTION, SOLUTION EPIDURAL; INFILTRATION; INTRACAUDAL; PERINEURAL
Status: COMPLETED | OUTPATIENT
Start: 2025-05-01 | End: 2025-05-01

## 2025-05-01 RX ADMIN — LIDOCAINE HYDROCHLORIDE 10 ML: 10 INJECTION, SOLUTION EPIDURAL; INFILTRATION; INTRACAUDAL; PERINEURAL at 13:39

## 2025-05-01 ASSESSMENT — PAIN SCALES - GENERAL
PAINLEVEL_OUTOF10: 0 - NO PAIN

## 2025-05-01 NOTE — POST-PROCEDURE NOTE
Interventional Radiology Brief Postprocedure Note    Attending: Saad Benitez CNP    Assistant: none    Diagnosis: bilateral thyroid nodules    Description of procedure: A Fine Needle Aspiration was preformed of the left 3.1 cm and right 3.9 cm nodule of the bilateral lower lobe of the thyroid.       Anesthesia:  Local    Complications: None    Estimated Blood Loss: none    Medications (Filter: Administrations occurring from 1252 to 1408 on 05/01/25) As of 05/01/25 1408      lidocaine PF (Xylocaine) 10 mg/mL (1 %) injection (mL) Total volume:  10 mL      Date/Time Rate/Dose/Volume Action       05/01/25  1339 10 mL Given                   ID Type Source Tests Collected by Time   A : right inferior FNA nodule Non-Gynecologic Cytology THYROID FINE NEEDLE ASPIRATION RIGHT INFERIOR LOBE CYTOLOGY CONSULTATION (NON-GYNECOLOGIC) PARI Kim 5/1/2025 1355   B : left inferior FNA nodule Non-Gynecologic Cytology THYROID FINE NEEDLE ASPIRATION LEFT INFERIOR LOBE CYTOLOGY CONSULTATION (NON-GYNECOLOGIC) PARI Kim 5/1/2025 1348         See detailed result report with images in PACS.    The patient tolerated the procedure well without incident or complication and is in stable condition.

## 2025-05-01 NOTE — PRE-PROCEDURE NOTE
Interventional Radiology Preprocedure Note    Indication for procedure: The encounter diagnosis was Multiple thyroid nodules.    Relevant review of systems: Denies neck pain, sore throat or hoarseness    Relevant Labs:   Lab Results   Component Value Date    CREATININE 0.91 02/20/2025    EGFR 63 02/20/2025       Planned Sedation/Anesthesia: local    Airway assessment: normal    Directed physical examination:    Alert and oriented, no evidence of neck pain, swelling or hoarseness.    Benefits, risks and alternatives of procedure and planned sedation have been discussed with the patient and/or their representative. All questions answered and they agree to proceed.

## 2025-05-02 LAB
LABORATORY COMMENT REPORT: NORMAL
LABORATORY COMMENT REPORT: NORMAL
PATH REPORT.FINAL DX SPEC: NORMAL
PATH REPORT.GROSS SPEC: NORMAL
PATH REPORT.TOTAL CANCER: NORMAL

## 2025-05-06 ENCOUNTER — HOSPITAL ENCOUNTER (OUTPATIENT)
Dept: RADIOLOGY | Facility: EXTERNAL LOCATION | Age: 80
Discharge: HOME | End: 2025-05-06

## 2025-05-06 ENCOUNTER — OFFICE VISIT (OUTPATIENT)
Dept: ORTHOPEDIC SURGERY | Facility: HOSPITAL | Age: 80
End: 2025-05-06
Payer: MEDICARE

## 2025-05-06 DIAGNOSIS — M17.0 PRIMARY OSTEOARTHRITIS OF BOTH KNEES: ICD-10-CM

## 2025-05-06 DIAGNOSIS — M17.12 PRIMARY OSTEOARTHRITIS OF LEFT KNEE: ICD-10-CM

## 2025-05-06 DIAGNOSIS — M17.11 PRIMARY OSTEOARTHRITIS OF RIGHT KNEE: Primary | ICD-10-CM

## 2025-05-06 PROCEDURE — 99214 OFFICE O/P EST MOD 30 MIN: CPT | Mod: 25 | Performed by: STUDENT IN AN ORGANIZED HEALTH CARE EDUCATION/TRAINING PROGRAM

## 2025-05-06 PROCEDURE — 99214 OFFICE O/P EST MOD 30 MIN: CPT | Performed by: STUDENT IN AN ORGANIZED HEALTH CARE EDUCATION/TRAINING PROGRAM

## 2025-05-06 PROCEDURE — 2500000004 HC RX 250 GENERAL PHARMACY W/ HCPCS (ALT 636 FOR OP/ED): Mod: JZ | Performed by: STUDENT IN AN ORGANIZED HEALTH CARE EDUCATION/TRAINING PROGRAM

## 2025-05-06 PROCEDURE — 20611 DRAIN/INJ JOINT/BURSA W/US: CPT | Mod: RT | Performed by: STUDENT IN AN ORGANIZED HEALTH CARE EDUCATION/TRAINING PROGRAM

## 2025-05-06 RX ADMIN — Medication 48 MG: at 10:54

## 2025-05-06 NOTE — PROGRESS NOTES
"Sports Medicine Office Note    Today's Date:  05/06/2025     HPI: Kalyani Polk is a 80 y.o. *** who presents today for ***    ***    *** has no other complaints.    Physical Examination:     ***  The RIGHT knee has trace to grade 1 joint effusion. Patella crepitus and grind are positive. There is tenderness to the medial and lateral joint lines. Flexion and extension are without mechanical blocking. There is no instability with stress testing.  The LEFT knee has trace to grade 1 joint effusion. Patella crepitus and grind are positive. There is tenderness to the medial and lateral joint lines. Flexion and extension are without mechanical blocking. There is no instability with stress testing.  Skin - no rashes, sores, or open lesions. Strength, sensory and vascular exams are otherwise normal. There is no clubbing, cyanosis or edema.  Gait is slightly antalgic and tandem.    Imaging:  Radiographs of the *** were reviewed and revealed ***.    The studies were reviewed by me personally in the office today.    === 11/19/24 ===    XR KNEE 3 VIEWS RIGHT    - Impression -  Mild chondrocalcinosis. Mild osteophytosis. Small effusion      MACRO:  None    Signed by: Benigno Sales 11/20/2024 7:37 PM  Dictation workstation:   [id number]    Problem List Items Addressed This Visit           ICD-10-CM    Primary osteoarthritis of both knees M17.0    Relevant Orders    Point of Care Ultrasound (Completed)     Procedure Note:  Ultrasound-guided *** knee viscosupplementation injection with ***.  Injection number *** in a series of ***: After consent was obtained, the *** was prepped in a sterile fashion. Ultrasound guidance was used to help insure proper needle placement into the {Blank single:68700::\" joint\",\" soft tissue\"}, decrease patient discomfort, and decrease collateral damage. The joint was visualized and {Blank single:19197::\" Durolane 60 mg\",\" TriVisc 20 mg\",\" Gelsyn-3 16.8 mg\",\" Synvisc-One 48 mg\",\" Euflexxa 20 mg\",\" " "Orthovisc 30 mg\",\" Hyalgan 20 mg\",\" Monovisc 88 mg\",\" Gel-One 30 mg\"} was injected without any complications. Ultrasound images were saved on an internal file for later reference. The patient tolerated the procedure well and the area was cleaned and bandaged.  ***  Assessment and Plan:    We reviewed the exam and imaging findings and discussed the conservative and surgical treatment options. We agreed to {Arlette single:33418::\" right knee\",\" left knee\",\" bilateral knee\"} ultrasound-guided viscosupplementation injection with ***, #*** in a series of ***.  Patient was educated on the signs and symptoms of local reaction and infection and should call the office if experiencing any of these. Activity modifications discussed and recommended rest on the knee for the next 48 hours. After that, patient may return to normal activity. Physical therapy referral *** provided and discussed the importance of regular home exercises.  Recommended prescription doses of Tylenol, Voltaren gel, and encouraged use of ice as needed for acute flares of pain. Patient understands that this is not a cure, but an attempt to buy some time, decrease discomfort, and slow the arthritic process. Patient may ultimately require surgery, but we will exhaust all of our conservative treatment options prior to that. Plan for follow-up in {Arlette single:54320::\" 1 week for the next injection in the series\",\" 8 weeks\"}, otherwise may follow-up sooner if any new concerns arise.  Discussed this plan with the patient who is understanding and agreeable.    **This note was dictated using Dragon speech recognition software and was not corrected for spelling or grammatical errors**.    Armani Munson,   [facility] Care Sports Medicine  [facility] Sports Medicine [place]   " edema.  Gait is slightly antalgic and tandem.    Imaging:  Radiographs of bilateral knees obtained 11/19/2024 were reviewed and revealed mild to moderate tricompartmental DJD with chondrocalcinosis, otherwise no acute osseous abnormalities.      The studies were reviewed by me personally in the office today.    Problem List Items Addressed This Visit    None  Visit Diagnoses         Codes      Primary osteoarthritis of right knee    -  Primary M17.11    Relevant Orders    Point of Care Ultrasound (Completed)    L Inj/Asp: R knee      Primary osteoarthritis of left knee     M17.12          Procedure Note:  Ultrasound-guided right knee viscosupplementation injection with Synvisc-One: After consent was obtained, the right knee was prepped in a sterile fashion. Ultrasound guidance was used to help insure proper needle placement into the  joint, decrease patient discomfort, and decrease collateral damage. The joint was visualized and  Synvisc-One 48 mg was injected without any complications. Ultrasound images were saved on an internal file for later reference. The patient tolerated the procedure well and the area was cleaned and bandaged.  Patient ID: Kalyani Polk is a 80 y.o. female.    L Inj/Asp: R knee on 5/6/2025 10:54 AM  Indications: pain  Details: 22 G needle, ultrasound-guided superolateral approach  Medications: 48 mg hylan 48 mg/6 mL  Outcome: tolerated well, no immediate complications  Procedure, treatment alternatives, risks and benefits explained, specific risks discussed. Consent was given by the patient. Immediately prior to procedure a time out was called to verify the correct patient, procedure, equipment, support staff and site/side marked as required. Patient was prepped and draped in the usual sterile fashion.         Assessment and Plan:    We reviewed the exam and imaging findings and discussed the conservative and surgical treatment options. We agreed to  right knee ultrasound-guided  viscosupplementation injection with Synvisc-One.  Patient was educated on the signs and symptoms of local reaction and infection and should call the office if experiencing any of these. Activity modifications discussed and recommended rest on the knee for the next 48 hours. After that, patient may return to normal activity.  Discussed the importance of regular home exercises.  Recommended prescription doses of Tylenol, Voltaren gel, and encouraged use of ice as needed for acute flares of pain. Patient understands that this is not a cure, but an attempt to buy some time, decrease discomfort, and slow the arthritic process. Patient may ultimately require surgery, but we will exhaust all of our conservative treatment options prior to that. Plan for follow-up in  8 weeks, otherwise may follow-up sooner if any new concerns arise.  May consider trial of viscosupplementation injection of left knee if/when pain returns or worsens, but deferred at this time as pain is generally tolerable.  Discussed this plan with the patient who is understanding and agreeable.    **This note was dictated using Dragon speech recognition software and was not corrected for spelling or grammatical errors**.    Armani Munson,   Primary Care Sports Medicine  [facility] Medicine [place]

## 2025-05-15 ENCOUNTER — APPOINTMENT (OUTPATIENT)
Dept: PRIMARY CARE | Facility: CLINIC | Age: 80
End: 2025-05-15
Payer: MEDICARE

## 2025-05-15 VITALS
TEMPERATURE: 96.9 F | HEIGHT: 63 IN | OXYGEN SATURATION: 97 % | HEART RATE: 83 BPM | DIASTOLIC BLOOD PRESSURE: 60 MMHG | BODY MASS INDEX: 29.2 KG/M2 | WEIGHT: 164.8 LBS | SYSTOLIC BLOOD PRESSURE: 118 MMHG

## 2025-05-15 DIAGNOSIS — E04.2 MULTIPLE THYROID NODULES: Primary | ICD-10-CM

## 2025-05-15 DIAGNOSIS — I10 BENIGN ESSENTIAL HYPERTENSION: ICD-10-CM

## 2025-05-15 PROCEDURE — 1160F RVW MEDS BY RX/DR IN RCRD: CPT | Performed by: FAMILY MEDICINE

## 2025-05-15 PROCEDURE — 1036F TOBACCO NON-USER: CPT | Performed by: FAMILY MEDICINE

## 2025-05-15 PROCEDURE — 1159F MED LIST DOCD IN RCRD: CPT | Performed by: FAMILY MEDICINE

## 2025-05-15 PROCEDURE — 99211 OFF/OP EST MAY X REQ PHY/QHP: CPT | Performed by: FAMILY MEDICINE

## 2025-05-15 PROCEDURE — 3074F SYST BP LT 130 MM HG: CPT | Performed by: FAMILY MEDICINE

## 2025-05-15 PROCEDURE — 3078F DIAST BP <80 MM HG: CPT | Performed by: FAMILY MEDICINE

## 2025-05-15 ASSESSMENT — ENCOUNTER SYMPTOMS
DEPRESSION: 0
LOSS OF SENSATION IN FEET: 0
OCCASIONAL FEELINGS OF UNSTEADINESS: 0

## 2025-05-15 NOTE — PATIENT INSTRUCTIONS
Follow up after thyroid biopsy.  Pathology showed benign follicular cysts.  No follow up is needed.  No treatment is necessary.    BP controlled.  Continue amlodipine 10 mg.  Plan to follow up for Medicare Wellness    Discussed trying Lactaid prior to eating ice cream, and see if that helps symptoms.

## 2025-05-15 NOTE — PROGRESS NOTES
"Subjective   Patient ID: Kalyani Polk is a 80 y.o. female who presents for Follow-up.  Patient here for follow up after thyroid nodule biopsy.  Had 2 biopsied, results were benign follicular cysts.  Not symptomatic.    Had IBS flare recently with ice cream.  Still a little bloated.          Review of Systems    Objective   /60 (BP Location: Right arm, Patient Position: Sitting, BP Cuff Size: Adult)   Pulse 83   Temp 36.1 °C (96.9 °F) (Temporal)   Ht 1.6 m (5' 3\")   Wt 74.8 kg (164 lb 12.8 oz)   SpO2 97%   BMI 29.19 kg/m²     Physical Exam  Vitals reviewed.   Constitutional:       Appearance: Normal appearance.   Cardiovascular:      Rate and Rhythm: Normal rate and regular rhythm.   Pulmonary:      Effort: Pulmonary effort is normal.      Breath sounds: Normal breath sounds.   Neurological:      Mental Status: She is alert.           Assessment/Plan   Problem List Items Addressed This Visit       Benign essential hypertension     Other Visit Diagnoses         Multiple thyroid nodules    -  Primary               "

## 2025-06-16 ENCOUNTER — OFFICE VISIT (OUTPATIENT)
Dept: PRIMARY CARE | Facility: CLINIC | Age: 80
End: 2025-06-16
Payer: MEDICARE

## 2025-06-16 VITALS
TEMPERATURE: 97.3 F | HEART RATE: 108 BPM | SYSTOLIC BLOOD PRESSURE: 134 MMHG | WEIGHT: 160.2 LBS | HEIGHT: 63 IN | OXYGEN SATURATION: 97 % | BODY MASS INDEX: 28.39 KG/M2 | DIASTOLIC BLOOD PRESSURE: 68 MMHG

## 2025-06-16 DIAGNOSIS — J30.1 SEASONAL ALLERGIC RHINITIS DUE TO POLLEN: Primary | ICD-10-CM

## 2025-06-16 DIAGNOSIS — J01.00 ACUTE NON-RECURRENT MAXILLARY SINUSITIS: ICD-10-CM

## 2025-06-16 PROCEDURE — 3078F DIAST BP <80 MM HG: CPT | Performed by: NURSE PRACTITIONER

## 2025-06-16 PROCEDURE — 1160F RVW MEDS BY RX/DR IN RCRD: CPT | Performed by: NURSE PRACTITIONER

## 2025-06-16 PROCEDURE — 1036F TOBACCO NON-USER: CPT | Performed by: NURSE PRACTITIONER

## 2025-06-16 PROCEDURE — 3075F SYST BP GE 130 - 139MM HG: CPT | Performed by: NURSE PRACTITIONER

## 2025-06-16 PROCEDURE — 99213 OFFICE O/P EST LOW 20 MIN: CPT | Performed by: NURSE PRACTITIONER

## 2025-06-16 PROCEDURE — 1159F MED LIST DOCD IN RCRD: CPT | Performed by: NURSE PRACTITIONER

## 2025-06-16 RX ORDER — AZITHROMYCIN 250 MG/1
TABLET, FILM COATED ORAL
Qty: 6 TABLET | Refills: 0 | Status: SHIPPED | OUTPATIENT
Start: 2025-06-16 | End: 2025-06-21

## 2025-06-16 NOTE — PROGRESS NOTES
"Subjective   Patient ID: Kalyani Polk is a 80 y.o. female who presents for Sinusitis (x1 week, sinus drainage, plugged ears).    History of Present Illness  The patient came in today because she's been dealing with symptoms that seem like a sinus infection for the past few weeks. She feels congested all the time, has constant drainage in her throat, and her ears feel blocked. She also mentioned that her voice sounds hoarse, but she hasn't had any trouble breathing or any fever. She tried using over-the-counter Zyrtec, but it hasn't helped, and she's used Claritin before too. She continues to use Astelin nasal spray.    She has seasonal allergies.    Recently, she had a thyroid biopsy and is worried it might be causing some issues. She sometimes feels a mild soreness in her throat but isn't feeling it today. She also has trouble swallowing pills.    She strained her left shoulder while lifting boxes on 06/15/2025.    Review of Systems    Objective     /68 (BP Location: Right arm, Patient Position: Sitting, BP Cuff Size: Adult)   Pulse 108   Temp 36.3 °C (97.3 °F) (Temporal)   Ht 1.6 m (5' 3\")   Wt 72.7 kg (160 lb 3.2 oz)   SpO2 97%   BMI 28.38 kg/m²      Physical Exam  Vitals and nursing note reviewed.   Constitutional:       General: She is not in acute distress.     Appearance: She is ill-appearing. She is not toxic-appearing.   HENT:      Head: Normocephalic.      Right Ear: Ear canal and external ear normal. A middle ear effusion is present.      Left Ear: Ear canal and external ear normal. A middle ear effusion is present.      Nose: Congestion present.      Right Turbinates: Swollen.      Left Turbinates: Swollen.      Mouth/Throat:      Mouth: Mucous membranes are moist.      Pharynx: Posterior oropharyngeal erythema and postnasal drip present.   Eyes:      Conjunctiva/sclera: Conjunctivae normal.   Cardiovascular:      Rate and Rhythm: Normal rate and regular rhythm.      Heart sounds: Normal heart " sounds. No murmur heard.  Pulmonary:      Effort: Pulmonary effort is normal. No respiratory distress.      Breath sounds: Normal breath sounds.   Lymphadenopathy:      Cervical: Cervical adenopathy present.        Results  Diagnostic Testing   - Thyroid biopsy: Benign follicular nodules, one of which had cysts.       Assessment/Plan   Diagnoses and all orders for this visit:  Seasonal allergic rhinitis due to pollen  Acute non-recurrent maxillary sinusitis  -     azithromycin (Zithromax) 250 mg tablet; Take 2 tablets (500 mg) by mouth once daily for 1 day, THEN 1 tablet (250 mg) once daily for 4 days. Take 2 tabs (500 mg) by mouth today, than 1 daily for 4 days.    Assessment & Plan  1. Sinus infection: Acute.  - Prescription for Z-Freddy will be sent to the pharmacy.  - Continue using Zyrtec and Astelin nasal spray.    2. Seasonal allergies: Chronic.  - Continue using Zyrtec and Astelin nasal spray consistently to manage symptoms.    3. Benign follicular nodules: Stable.  - Previous biopsy results showed benign follicular nodules with cysts.  - Nodules are low-risk and not likely to turn into cancer.  - No treatment required for the nodules at this time.    4. Muscle strain: Acute.  - Hot compresses recommended to help loosen tight muscles.    Follow-up  - Prescription for Z-Freddy will be sent to [facility] pharmacy.       Patient Instructions   Zpak for sinus infection  Increase fluids  Continue to use nasal spray  Ok to use Zyrtec daily - can consider switching to allegra or Xyzal if not getting adequate allergy control  Follow up if symptoms not improving with treatments.     This medical note was created with the assistance of artificial intelligence (AI) for documentation purposes. The content has been reviewed and confirmed by the healthcare provider for accuracy and completeness. Patient consented to the use of audio recording and use of AI during their visit.

## 2025-06-16 NOTE — PATIENT INSTRUCTIONS
Zpak for sinus infection  Increase fluids  Continue to use nasal spray  Ok to use Zyrtec daily - can consider switching to allegra or Xyzal if not getting adequate allergy control  Follow up if symptoms not improving with treatments.

## 2025-06-23 ENCOUNTER — TELEPHONE (OUTPATIENT)
Dept: PRIMARY CARE | Facility: CLINIC | Age: 80
End: 2025-06-23
Payer: MEDICARE

## 2025-06-23 DIAGNOSIS — K21.9 GASTROESOPHAGEAL REFLUX DISEASE WITHOUT ESOPHAGITIS: ICD-10-CM

## 2025-06-23 DIAGNOSIS — J01.00 ACUTE NON-RECURRENT MAXILLARY SINUSITIS: ICD-10-CM

## 2025-06-23 RX ORDER — PANTOPRAZOLE SODIUM 40 MG/1
40 TABLET, DELAYED RELEASE ORAL DAILY
Qty: 90 TABLET | Refills: 0 | Status: SHIPPED | OUTPATIENT
Start: 2025-06-23

## 2025-06-23 RX ORDER — AZELASTINE 1 MG/ML
2 SPRAY, METERED NASAL 2 TIMES DAILY
Qty: 30 ML | Refills: 1 | Status: SHIPPED | OUTPATIENT
Start: 2025-06-23

## 2025-06-23 NOTE — TELEPHONE ENCOUNTER
Rx Refill Request Telephone Encounter    Name:  Kalyani Polk  :  [id number]  Medication Name:    azelastine (Astelin) 137 mcg (0.1 %) nasal spray  pantoprazole (ProtoNix) 40 mg EC tablet   Specific Pharmacy location:  [facility]  Date of last appointment:  5/15/25  Date of next appointment:  25

## 2025-06-25 ENCOUNTER — TELEPHONE (OUTPATIENT)
Dept: PRIMARY CARE | Facility: CLINIC | Age: 80
End: 2025-06-25
Payer: MEDICARE

## 2025-06-25 DIAGNOSIS — J01.00 ACUTE NON-RECURRENT MAXILLARY SINUSITIS: Primary | ICD-10-CM

## 2025-06-25 RX ORDER — CEFUROXIME AXETIL 250 MG/1
250 TABLET ORAL 2 TIMES DAILY
Qty: 20 TABLET | Refills: 0 | Status: SHIPPED | OUTPATIENT
Start: 2025-06-25 | End: 2025-07-05

## 2025-06-25 NOTE — TELEPHONE ENCOUNTER
Rx Refill Request Telephone Encounter    Name:  Kalyani Polk  :  [id number]  Medication Name:    azithromycin (Zithromax) 250 mg tablet   Specific Pharmacy location:  [facility]  Date of last appointment:  25  Date of next appointment:  25

## 2025-06-27 ENCOUNTER — APPOINTMENT (OUTPATIENT)
Dept: ORTHOPEDIC SURGERY | Facility: HOSPITAL | Age: 80
End: 2025-06-27
Payer: MEDICARE

## 2025-07-01 ENCOUNTER — APPOINTMENT (OUTPATIENT)
Dept: RADIOLOGY | Facility: HOSPITAL | Age: 80
End: 2025-07-01
Payer: MEDICARE

## 2025-07-01 ENCOUNTER — APPOINTMENT (OUTPATIENT)
Dept: CARDIOLOGY | Facility: HOSPITAL | Age: 80
End: 2025-07-01
Payer: MEDICARE

## 2025-07-01 ENCOUNTER — HOSPITAL ENCOUNTER (EMERGENCY)
Facility: HOSPITAL | Age: 80
Discharge: HOME | End: 2025-07-01
Attending: EMERGENCY MEDICINE
Payer: MEDICARE

## 2025-07-01 VITALS
OXYGEN SATURATION: 95 % | HEIGHT: 63 IN | TEMPERATURE: 98.2 F | HEART RATE: 108 BPM | RESPIRATION RATE: 18 BRPM | DIASTOLIC BLOOD PRESSURE: 72 MMHG | SYSTOLIC BLOOD PRESSURE: 145 MMHG | WEIGHT: 160 LBS | BODY MASS INDEX: 28.35 KG/M2

## 2025-07-01 DIAGNOSIS — M79.602 PAIN OF LEFT UPPER EXTREMITY: Primary | ICD-10-CM

## 2025-07-01 LAB
ALBUMIN SERPL BCP-MCNC: 4.1 G/DL (ref 3.4–5)
ALP SERPL-CCNC: 52 U/L (ref 33–136)
ALT SERPL W P-5'-P-CCNC: 13 U/L (ref 7–45)
ANION GAP SERPL CALC-SCNC: 16 MMOL/L (ref 10–20)
AST SERPL W P-5'-P-CCNC: 15 U/L (ref 9–39)
ATRIAL RATE: 97 BPM
BASOPHILS # BLD AUTO: 0.08 X10*3/UL (ref 0–0.1)
BASOPHILS NFR BLD AUTO: 0.7 %
BILIRUB SERPL-MCNC: 0.8 MG/DL (ref 0–1.2)
BUN SERPL-MCNC: 12 MG/DL (ref 6–23)
CALCIUM SERPL-MCNC: 9 MG/DL (ref 8.6–10.3)
CARDIAC TROPONIN I PNL SERPL HS: 4 NG/L (ref 0–13)
CHLORIDE SERPL-SCNC: 101 MMOL/L (ref 98–107)
CO2 SERPL-SCNC: 22 MMOL/L (ref 21–32)
CREAT SERPL-MCNC: 1 MG/DL (ref 0.5–1.05)
EGFRCR SERPLBLD CKD-EPI 2021: 56 ML/MIN/1.73M*2
EOSINOPHIL # BLD AUTO: 0.08 X10*3/UL (ref 0–0.4)
EOSINOPHIL NFR BLD AUTO: 0.7 %
ERYTHROCYTE [DISTWIDTH] IN BLOOD BY AUTOMATED COUNT: 13.7 % (ref 11.5–14.5)
GLUCOSE SERPL-MCNC: 99 MG/DL (ref 74–99)
HCT VFR BLD AUTO: 41.8 % (ref 36–46)
HGB BLD-MCNC: 13.6 G/DL (ref 12–16)
IMM GRANULOCYTES # BLD AUTO: 0.04 X10*3/UL (ref 0–0.5)
IMM GRANULOCYTES NFR BLD AUTO: 0.4 % (ref 0–0.9)
LACTATE SERPL-SCNC: 0.8 MMOL/L (ref 0.4–2)
LYMPHOCYTES # BLD AUTO: 1.48 X10*3/UL (ref 0.8–3)
LYMPHOCYTES NFR BLD AUTO: 13 %
MCH RBC QN AUTO: 26.7 PG (ref 26–34)
MCHC RBC AUTO-ENTMCNC: 32.5 G/DL (ref 32–36)
MCV RBC AUTO: 82 FL (ref 80–100)
MONOCYTES # BLD AUTO: 1.3 X10*3/UL (ref 0.05–0.8)
MONOCYTES NFR BLD AUTO: 11.4 %
NEUTROPHILS # BLD AUTO: 8.4 X10*3/UL (ref 1.6–5.5)
NEUTROPHILS NFR BLD AUTO: 73.8 %
NRBC BLD-RTO: 0 /100 WBCS (ref 0–0)
P AXIS: 39 DEGREES
P OFFSET: 200 MS
P ONSET: 146 MS
PLATELET # BLD AUTO: 283 X10*3/UL (ref 150–450)
POTASSIUM SERPL-SCNC: 3.5 MMOL/L (ref 3.5–5.3)
PR INTERVAL: 152 MS
PROT SERPL-MCNC: 7.6 G/DL (ref 6.4–8.2)
Q ONSET: 222 MS
QRS COUNT: 16 BEATS
QRS DURATION: 74 MS
QT INTERVAL: 356 MS
QTC CALCULATION(BAZETT): 452 MS
QTC FREDERICIA: 417 MS
R AXIS: 65 DEGREES
RBC # BLD AUTO: 5.09 X10*6/UL (ref 4–5.2)
SODIUM SERPL-SCNC: 135 MMOL/L (ref 136–145)
T AXIS: 30 DEGREES
T OFFSET: 400 MS
VENTRICULAR RATE: 97 BPM
WBC # BLD AUTO: 11.4 X10*3/UL (ref 4.4–11.3)

## 2025-07-01 PROCEDURE — 93971 EXTREMITY STUDY: CPT | Performed by: STUDENT IN AN ORGANIZED HEALTH CARE EDUCATION/TRAINING PROGRAM

## 2025-07-01 PROCEDURE — 93005 ELECTROCARDIOGRAM TRACING: CPT

## 2025-07-01 PROCEDURE — 73110 X-RAY EXAM OF WRIST: CPT | Mod: LEFT SIDE | Performed by: STUDENT IN AN ORGANIZED HEALTH CARE EDUCATION/TRAINING PROGRAM

## 2025-07-01 PROCEDURE — 36415 COLL VENOUS BLD VENIPUNCTURE: CPT

## 2025-07-01 PROCEDURE — 93971 EXTREMITY STUDY: CPT

## 2025-07-01 PROCEDURE — 80053 COMPREHEN METABOLIC PANEL: CPT

## 2025-07-01 PROCEDURE — 73110 X-RAY EXAM OF WRIST: CPT | Mod: LT

## 2025-07-01 PROCEDURE — 84484 ASSAY OF TROPONIN QUANT: CPT

## 2025-07-01 PROCEDURE — 99285 EMERGENCY DEPT VISIT HI MDM: CPT | Mod: 25 | Performed by: EMERGENCY MEDICINE

## 2025-07-01 PROCEDURE — 2500000001 HC RX 250 WO HCPCS SELF ADMINISTERED DRUGS (ALT 637 FOR MEDICARE OP)

## 2025-07-01 PROCEDURE — 2500000002 HC RX 250 W HCPCS SELF ADMINISTERED DRUGS (ALT 637 FOR MEDICARE OP, ALT 636 FOR OP/ED)

## 2025-07-01 PROCEDURE — 83605 ASSAY OF LACTIC ACID: CPT

## 2025-07-01 PROCEDURE — 85025 COMPLETE CBC W/AUTO DIFF WBC: CPT

## 2025-07-01 RX ORDER — DIAZEPAM 5 MG/1
5 TABLET ORAL ONCE
Status: COMPLETED | OUTPATIENT
Start: 2025-07-01 | End: 2025-07-01

## 2025-07-01 RX ORDER — MELOXICAM 7.5 MG/1
7.5 TABLET ORAL DAILY
Qty: 14 TABLET | Refills: 0 | Status: SHIPPED | OUTPATIENT
Start: 2025-07-01 | End: 2025-07-15

## 2025-07-01 RX ORDER — MELOXICAM 7.5 MG/1
7.5 TABLET ORAL ONCE
Status: COMPLETED | OUTPATIENT
Start: 2025-07-01 | End: 2025-07-01

## 2025-07-01 RX ADMIN — DIAZEPAM 5 MG: 5 TABLET ORAL at 09:24

## 2025-07-01 RX ADMIN — MELOXICAM 7.5 MG: 7.5 TABLET ORAL at 10:37

## 2025-07-01 ASSESSMENT — PAIN DESCRIPTION - LOCATION: LOCATION: HAND

## 2025-07-01 ASSESSMENT — PAIN - FUNCTIONAL ASSESSMENT: PAIN_FUNCTIONAL_ASSESSMENT: 0-10

## 2025-07-01 ASSESSMENT — PAIN SCALES - GENERAL: PAINLEVEL_OUTOF10: 5 - MODERATE PAIN

## 2025-07-01 NOTE — ED PROVIDER NOTES
Chief Complaint   Patient presents with    Hand Pain       80-year-old female arrives to the emergency department the chief complaint of an acute onset of pain in the left upper extremity starting in mid forearm going down into the wrist and hand including the fingers, worst pain being in the area of the wrist.  There is no appreciable soft tissue swelling, warmth, erythema.  The patient states that she was watching TV at approximately 2100 last night, and pain came on abruptly.  Patient relates it to pain that she had in the right wrist where she had a carpal tunnel surgery in 12/2024 and states that it feels similar.  The patient endorses a 9 out of 10 pain, is in obvious discomfort with any palpation even light palpation.  The patient denies any chest pain, shortness of breath, or any other accompanying symptoms, however the patient is tachycardic at 105 on initial exam.  Patient denies any other symptoms or complaints, denies any other similar episodes of this pain.  Patient is neurovascularly intact in bilateral upper extremities, stating that she has intermittent numbness in the left hand since symptoms began.      History provided by:  Patient   used: No         PmHx, PsHx, Allergies, Family Hx, social Hx reviewed as documented    A complete 10 point review of systems was performed and is negative except for as mentioned in the HPI.    Physical Exam:    General: Patient is AAOx3, appears well developed, well nourished, is a good historian, answers questions appropriately    HEENT: head normocephalic, atraumatic, PERRLA, EOMs intact, oropharynx without erythema or exudate, buccal mucosa intact without lesions, TMs unremarkable, nose is patent bilateral    Neck: supple, full ROM, negative for lymphadenopathy, JVD, thyromegaly, tracheal deviation, nuccal rigidity    Pulmonary: CTAB, no accessory muscle use, able to speak full clear sentences    Cardiac: HRRR, no murmurs, rubs or  gallops    GI: soft, non-tender, non-distended, BS + x 4, no masses or organomegaly, no guarding or CVA tenderness noted, negative yepez's, mcburney's    Musculoskeletal: Left upper extremity pain full weight bearing, GAXIOLA, no joint effusions, clubbing or edema noted    Skin: intact, no lesions or rashes noted, turgor is good.    Neuro: patient follow commands, cranial nerves 2-12 grossly intact, motor strengths 5/5 upper and lower extremities, DTR's and sensation are symmetrical. No focal deficits.    Rectal/: No urinary burning, urgency, change in frequency.  Patient has no rectal complaints        Medical Decision Making  This patient was seen, treated, and evaluated in conjunction with Dr. Ordonez    Primary consideration for the patient would be nerve impingement although pain does not follow a specific nerve path, necrotizing fasciitis, DVT, underlying cellulitis, or other causative factors, tachycardia could be due to the patient's pain level although the patient not presenting as writhing in pain.  EKG, diagnostic blood work, x-ray of left wrist, DVT left upper extremity will be used to further evaluate.  Patient initially given p.o. Valium 5 mg.    The patient's EKG was done on 7/1/2025 at 0 834, the EKG was interpreted by the attending physician as no STEMI, the EKG is interpreted by me shows a sinus rhythm with a rate of 97 WA interval 152 and a QTc of 452, there is no ST abnormality appreciated    The patient's diagnostic blood work shows mild leukocytosis with a white blood cell count of 11.4, otherwise negative for any acute abnormality including a negative high-sensitivity troponin    On reassessment, the patient states that the pain has slightly improved since her arrival to the emergency department.  The ultrasound study of the left upper extremity is negative for any acute abnormality.  The x-ray shows no acute abnormality, showing chronic osteoarthritis which is severe as well as mild to  "severe carpometacarpal joint space narrowing, likely contributing factor to the patient's pain.    Patient given meloxicam as an anti-inflammatory and will given outpatient prescription of the same.  Patient will follow-up with a hand surgeon that did her carpal tunnel surgery at the end of last year as well as her primary care provider    Patient is amenable to the plan of discharge as outlined above, all patient's questions pertaining to their ED course were answered in their entirety.  Strict return precautions were discussed with the patient and they verbalized understanding.  Further, it was made clear to the patient that from an emergent basis, all effort and testing was done to eliminate any imminent dangerous or potentially dangerous conditions of the patient however if their symptoms get much worse or feel life-threatening, they are to return to the emergency department or call 911 immediately.    Amount and/or Complexity of Data Reviewed  Labs: ordered. Decision-making details documented in ED Course.  Radiology: ordered. Decision-making details documented in ED Course.  ECG/medicine tests: ordered. Decision-making details documented in ED Course.       Diagnoses as of 07/01/25 1026   Pain of left upper extremity       The patient has had the following imaging during this ER visit: XR WRIST LEFT 3+ VIEWS  VASC US UPPER EXTREMITY VENOUS DUPLEX LEFT     Patient History   Medical History[1]  Surgical History[2]  Family History[3]  Social History[4]    ED Triage Vitals [07/01/25 0713]   Temperature Heart Rate Respirations BP   36.8 °C (98.2 °F) 98 18 144/63      Pulse Ox Temp src Heart Rate Source Patient Position   98 % -- -- --      BP Location FiO2 (%)     -- --       Vitals:    07/01/25 0713 07/01/25 0935 07/01/25 1000   BP: 144/63 140/71 145/72   Pulse: 98 (!) 108    Resp: 18 18    Temp: 36.8 °C (98.2 °F)     SpO2: 98% 98% 95%   Weight: 72.6 kg (160 lb)     Height: 1.6 m (5' 3\")                   [1] " "  Past Medical History:  Diagnosis Date    Arthritis     GERD (gastroesophageal reflux disease)     Hyperlipidemia     Hypertension     Personal history of other malignant neoplasm of large intestine 01/17/2021    History of malignant neoplasm of colon (1986)    Sleep apnea     does not use cpap   [2]   Past Surgical History:  Procedure Laterality Date    COLON SURGERY      bowel resection 1986 for colon cancer    COLONOSCOPY      DILATION AND CURETTAGE OF UTERUS      HYSTEROSCOPY      MR HEAD ANGIO W IV CONTRAST  12/06/2023    MR HEAD ANGIO W IV CONTRAST 12/6/2023 EF RAD EXTERNAL FILM    TUBAL LIGATION     [3]   Family History  Problem Relation Name Age of Onset    Dementia Mother      Osteoporosis Mother      Other (neoplasm of colon) Father      Other (CVA) Sister     [4]   Social History  Tobacco Use    Smoking status: Never     Passive exposure: Never    Smokeless tobacco: Never    Tobacco comments:     Denies SOB with stairs or lying flat. Ambulates freely. No illness last 30 days. Denies personal or family reaction to anesthesia.    Vaping Use    Vaping status: Never Used   Substance Use Topics    Alcohol use: Not Currently     Comment: has wine \"once a year.\"    Drug use: Never        ASHLEE Hodgson-CNP  07/01/25 1026    "

## 2025-07-01 NOTE — DISCHARGE INSTRUCTIONS
As discussed, please follow-up with both your primary care physician as well as your established hand surgeon that did your carpal tunnel surgery in December of last year

## 2025-07-01 NOTE — ED TRIAGE NOTES
Pt to ED with complaint of left hand and arm pain. States onset of around 2100 last night. Pt was at rest when pain started. Pt denies numbness, denies any other associated sx. Had carpal tunnel surgery in December of 2024. States pain feels similar.

## 2025-07-13 DIAGNOSIS — I10 BENIGN ESSENTIAL HYPERTENSION: ICD-10-CM

## 2025-07-13 RX ORDER — AMLODIPINE BESYLATE 10 MG/1
10 TABLET ORAL DAILY
Qty: 90 TABLET | Refills: 1 | Status: SHIPPED | OUTPATIENT
Start: 2025-07-13

## 2025-07-15 ENCOUNTER — APPOINTMENT (OUTPATIENT)
Dept: ORTHOPEDIC SURGERY | Facility: HOSPITAL | Age: 80
End: 2025-07-15
Payer: MEDICARE

## 2025-07-19 LAB
ATRIAL RATE: 97 BPM
P AXIS: 39 DEGREES
P OFFSET: 200 MS
P ONSET: 146 MS
PR INTERVAL: 152 MS
Q ONSET: 222 MS
QRS COUNT: 16 BEATS
QRS DURATION: 74 MS
QT INTERVAL: 356 MS
QTC CALCULATION(BAZETT): 452 MS
QTC FREDERICIA: 417 MS
R AXIS: 65 DEGREES
T AXIS: 30 DEGREES
T OFFSET: 400 MS
VENTRICULAR RATE: 97 BPM

## 2025-07-22 ENCOUNTER — OFFICE VISIT (OUTPATIENT)
Dept: ORTHOPEDIC SURGERY | Facility: HOSPITAL | Age: 80
End: 2025-07-22
Payer: MEDICARE

## 2025-07-22 DIAGNOSIS — M17.0 PRIMARY OSTEOARTHRITIS OF BOTH KNEES: Primary | ICD-10-CM

## 2025-07-22 PROCEDURE — 1159F MED LIST DOCD IN RCRD: CPT | Performed by: STUDENT IN AN ORGANIZED HEALTH CARE EDUCATION/TRAINING PROGRAM

## 2025-07-22 PROCEDURE — 99212 OFFICE O/P EST SF 10 MIN: CPT

## 2025-07-22 PROCEDURE — 99213 OFFICE O/P EST LOW 20 MIN: CPT | Performed by: STUDENT IN AN ORGANIZED HEALTH CARE EDUCATION/TRAINING PROGRAM

## 2025-07-22 NOTE — PROGRESS NOTES
Sports Medicine Office Note    Today's Date:  07/22/2025     HPI: Kalyani Polk is a 80 y.o. female who presents today for follow-up of chronic bilateral knee pain.     12/10/2024: She states her pain started roughly 3-4 months ago with insidious onset and no associated injury/trauma.  States pain is worse in the right knee compared to the left, worse with standing/walking for longer durations.  States she has noticed mild swelling of her right knee but denies any redness, warmth, bruising.  States pain is throughout the entire right knee without any radiation of pain, numbness, tingling, weakness.  She has tried Tylenol, Voltaren gel, icing all with minimal to no improvement.  She has also been prescribed hydrocodone for recent carpal tunnel surgery, but states this does not seem to help much with her knee pain.  She denies any history of surgeries to either knee.      2/4/2025: She presents today for follow-up of chronic bilateral knee pain s/p right knee joint cortisone injection on 12/10/2024.  She states she got minimal relief after this cortisone injection.  Denies any interval injury/trauma or any new redness, warmth, bruising, numbness, tingling, or weakness.  States she still has mild swelling over her right knee without any acute worsening.  She states her pain is primarily over the lateral aspect of the right knee with proximal and distal radiation up towards the hip and down to the ankle.  She has been using OTC analgesics/anti-inflammatories without significant improvement.  She has been following with PT regularly and doing home exercises in addition to using a Econo hinged knee brace which does seem to help with her overall knee stability.  She states she still has left knee pain, though this is less severe than pain in her right knee.     4/5/2025: She presents today for follow-up of chronic bilateral knee pain, right worse than left.  She has been following with PT regularly and taking OTC  analgesics/anti-inflammatories in addition to following home exercises, but states she has not had any significant improvement since previous evaluation.  States pain is still primarily over the lateral aspect of the right knee, though pain is throughout the knee joint with mild associated chronic swelling.  Denies any new redness, warmth, bruising, numbness, tingling, or weakness.  She is interested in trial of viscosupplementation injections if appropriate.    5/6/2025: She presents today for follow-up of chronic bilateral knee pain and for trial of viscosupplementation injection with Synvisc-One.  She is s/p right knee joint aspiration and cortisone injection on 4/5/2025.  States she got mild relief following this injection with progressive return and worsening of pain over the last few weeks.  Denies interval injury/trauma or any new redness, warmth, bruising, radiation of pain, numbness, tingling, weakness.  States she has mild swelling of the right knee without acute worsening.  States she would like to proceed with viscosupplementation injection of the right knee today if appropriate.  She states her left knee has mild pain, but this is generally manageable without any acute worsening or interval injury/trauma.    7/22/25: She presents today for follow-up of chronic bilateral knee pain. She received 99% relief of her right knee pain with the viscosupplementation injection performed on 5/6/25. Denies interval injury/trauma or any new redness, warmth, bruising, radiation of pain, numbness, tingling, weakness.  States she has mild swelling of the right knee without acute worsening. Given her significant improvement of pain, she is interested in repeating the injection if/when symptoms return. Her left knee does cause some minor pain, but it is tolerable and she has deferred any treatment at this time.     She has no other complaints.    Physical Examination:     The RIGHT knee has trace to grade 1 joint  effusion. Patella crepitus and grind are negative. There is no tenderness to the medial and lateral joint lines. Flexion and extension are without mechanical blocking. There is no instability with stress testing.  The LEFT knee has trace to grade 1 joint effusion. Patella crepitus and grind are positive. There is minimal tenderness to the medial and lateral joint lines. Flexion and extension are without mechanical blocking. There is no instability with stress testing.  Skin - no rashes, sores, or open lesions. Strength, sensory and vascular exams are otherwise normal. There is no clubbing, cyanosis or edema.  Gait is slightly antalgic and tandem.    Imaging:  Radiographs of bilateral knees obtained 11/19/2024 were reviewed and revealed mild to moderate tricompartmental DJD with chondrocalcinosis, otherwise no acute osseous abnormalities.      The studies were reviewed by me personally in the office today.    Problem List Items Addressed This Visit           ICD-10-CM    Primary osteoarthritis of both knees - Primary M17.0     Assessment and Plan:    We reviewed the exam and imaging findings and discussed the conservative and surgical treatment options. We agreed to repeat the viscosupplementation injection in 4 months (6 months after the first injection) if symptoms return. Discussed the importance of regular home exercises.  Recommended prescription doses of Tylenol, Voltaren gel, and encouraged use of ice as needed for acute flares of pain. Patient understands that this is not a cure, but an attempt to buy some time, decrease discomfort, and slow the arthritic process. Patient may ultimately require surgery, but we will exhaust all of our conservative treatment options prior to that. Plan for follow-up to repeat injection in 4 months if pain returns/worsens, otherwise may follow-up sooner if any new concerns arise.  May consider trial of viscosupplementation injection of left knee if/when pain worsens, but  deferred at this time as pain is generally tolerable.  Discussed this plan with the patient who is understanding and agreeable.    **This note was dictated using Dragon speech recognition software and was not corrected for spelling or grammatical errors**.    Sahil Blake, DO  PMR PGY-1  [facility]

## 2025-07-28 ENCOUNTER — APPOINTMENT (OUTPATIENT)
Dept: PRIMARY CARE | Facility: CLINIC | Age: 80
End: 2025-07-28
Payer: MEDICARE

## 2025-07-28 VITALS
WEIGHT: 163.8 LBS | TEMPERATURE: 97.2 F | DIASTOLIC BLOOD PRESSURE: 64 MMHG | OXYGEN SATURATION: 97 % | BODY MASS INDEX: 29.02 KG/M2 | SYSTOLIC BLOOD PRESSURE: 112 MMHG | HEART RATE: 91 BPM | HEIGHT: 63 IN

## 2025-07-28 DIAGNOSIS — Z00.00 ROUTINE GENERAL MEDICAL EXAMINATION AT HEALTH CARE FACILITY: Primary | ICD-10-CM

## 2025-07-28 DIAGNOSIS — R73.03 PREDIABETES: ICD-10-CM

## 2025-07-28 DIAGNOSIS — I10 BENIGN ESSENTIAL HYPERTENSION: ICD-10-CM

## 2025-07-28 DIAGNOSIS — E78.2 MIXED HYPERLIPIDEMIA: ICD-10-CM

## 2025-07-28 DIAGNOSIS — Z78.0 MENOPAUSE: ICD-10-CM

## 2025-07-28 DIAGNOSIS — Z23 ENCOUNTER FOR IMMUNIZATION: ICD-10-CM

## 2025-07-28 LAB — POC HEMOGLOBIN A1C: 6.2 % (ref 4.2–6.5)

## 2025-07-28 PROCEDURE — 3078F DIAST BP <80 MM HG: CPT | Performed by: FAMILY MEDICINE

## 2025-07-28 PROCEDURE — 83036 HEMOGLOBIN GLYCOSYLATED A1C: CPT | Performed by: FAMILY MEDICINE

## 2025-07-28 PROCEDURE — 1036F TOBACCO NON-USER: CPT | Performed by: FAMILY MEDICINE

## 2025-07-28 PROCEDURE — 1170F FXNL STATUS ASSESSED: CPT | Performed by: FAMILY MEDICINE

## 2025-07-28 PROCEDURE — 1159F MED LIST DOCD IN RCRD: CPT | Performed by: FAMILY MEDICINE

## 2025-07-28 PROCEDURE — 1160F RVW MEDS BY RX/DR IN RCRD: CPT | Performed by: FAMILY MEDICINE

## 2025-07-28 PROCEDURE — G0439 PPPS, SUBSEQ VISIT: HCPCS | Performed by: FAMILY MEDICINE

## 2025-07-28 PROCEDURE — 99397 PER PM REEVAL EST PAT 65+ YR: CPT | Performed by: FAMILY MEDICINE

## 2025-07-28 PROCEDURE — G0009 ADMIN PNEUMOCOCCAL VACCINE: HCPCS | Performed by: FAMILY MEDICINE

## 2025-07-28 PROCEDURE — 90677 PCV20 VACCINE IM: CPT | Performed by: FAMILY MEDICINE

## 2025-07-28 PROCEDURE — 3074F SYST BP LT 130 MM HG: CPT | Performed by: FAMILY MEDICINE

## 2025-07-28 ASSESSMENT — ACTIVITIES OF DAILY LIVING (ADL)
DOING_HOUSEWORK: INDEPENDENT
TAKING_MEDICATION: INDEPENDENT
GROCERY_SHOPPING: INDEPENDENT
MANAGING_FINANCES: INDEPENDENT
DRESSING: INDEPENDENT
BATHING: INDEPENDENT

## 2025-07-28 ASSESSMENT — ENCOUNTER SYMPTOMS
OCCASIONAL FEELINGS OF UNSTEADINESS: 0
LOSS OF SENSATION IN FEET: 0
DEPRESSION: 0

## 2025-07-28 NOTE — PATIENT INSTRUCTIONS
Immunizations recommended:  --Flu shot every fall.  --Prevnar 20 once,  to reduce risk of pneumonia. -done today  --Shingrix,to reduce risk of getting shingles.  -done  --Tetanus every 10 years -done in 2022  --Covid vaccine.  --RSV vaccine, once.  Can be done at the pharmacy.    Colonoscopy done in 2024, and needs to be repeated this year.  Scheduled at [facility].    Mammogram screening recommendations are changing, but at this time, I recommend a mammogram every 1-2 years in your 40's, and yearly after the age of 50.  Having a family history of breast cancer may change that.  Will be done at [facility].    You should try to get 150 minutes of exercise weekly.  Be sure to eat a diet rich in fruits and vegetables, and low in saturated fats and sodium.  Strive for a healthy BMI of less than 25.     Make sure to wear sun screen when outside, and check for changing or unusual moles.    Pre-menopause recommendations are for 1000 mg calcium and 1000 units vitamin D3 daily.  After menopause calcium recommendation is 1200 mg, with 1000 units of vitamin D3  Bone density can be done every 2 years to assess bone loss.  --ordered    I recommend you get a Living Will and Durable Power of  for Healthcare. These documents state what care you would want if you couldn't speak for yourself. They help your loved ones in caring for you if that happens.   Once you have those forms completed, you can keep a copy on file with your doctor.    Specialists being seen:  Ortho for knees   Gastroenterologist     Thyroid enlargement and nodules.  Pathology showed benign follicular cysts.  No follow up is needed.  No treatment is necessary.    BP controlled.  Continue amlodipine 10 mg.    Blood work done recently at the ED.  Will check A1C today, to follow up on prediabetes.    Follow up 6 months, or as needed.

## 2025-07-28 NOTE — PROGRESS NOTES
"Subjective   Reason for Visit: Kalyani Polk is an 80 y.o. female here for a Medicare Wellness visit.     Past Medical, Surgical, and Family History reviewed and updated in chart.    Reviewed all medications by prescribing practitioner or clinical pharmacist (such as prescriptions, OTCs, herbal therapies and supplements) and documented in the medical record.    Had gone to ED July 1 with left arm/hand pain.  Was given meloxicam 7.5 and it helped.  Pain is getting better, still some tingling in both fingertips.  Has seen Dr. Diaz in the past for CTS.    Stopped gabapentin, due to side effects.  Was taking it for  a pain her her leg, but is better now.    Has been doing OK.  No CP or SOB.  No leg swelling.    Had thyroid biopsy done that showed benign cyst.          Patient Care Team:  Marilyn Hernandez MD as PCP - General     Review of Systems    Objective   Vitals:  /64 (BP Location: Right arm, Patient Position: Sitting, BP Cuff Size: Adult)   Pulse 91   Temp 36.2 °C (97.2 °F) (Temporal)   Ht 1.6 m (5' 3\")   Wt 74.3 kg (163 lb 12.8 oz)   SpO2 97%   BMI 29.02 kg/m²       Physical Exam  Vitals reviewed.   Constitutional:       Appearance: Normal appearance.     Cardiovascular:      Rate and Rhythm: Normal rate and regular rhythm.      Heart sounds: No murmur heard.  Pulmonary:      Effort: Pulmonary effort is normal.      Breath sounds: Normal breath sounds.     Musculoskeletal:      Cervical back: Normal range of motion and neck supple.      Right lower leg: No edema.      Left lower leg: No edema.   Lymphadenopathy:      Cervical: No cervical adenopathy.     Neurological:      Mental Status: She is alert.     Psychiatric:         Mood and Affect: Mood normal.         Behavior: Behavior normal.         Assessment & Plan  Routine general medical examination at health care facility    Orders:    1 Year Follow Up In [facility] - PCP - Wellness Exam; Future    Benign essential hypertension     "     Mixed hyperlipidemia         Encounter for immunization    Orders:    Pneumococcal conjugate vaccine, 20-valent (PREVNAR 20)    Prediabetes    Orders:    POCT glycosylated hemoglobin (Hb A1C) manually resulted    Menopause    Orders:    XR DEXA bone density; Future    Body mass index (BMI) of 29.0 to 29.9 in adult

## 2025-08-05 ENCOUNTER — APPOINTMENT (OUTPATIENT)
Dept: ORTHOPEDIC SURGERY | Facility: HOSPITAL | Age: 80
End: 2025-08-05
Payer: MEDICARE

## 2025-08-08 ENCOUNTER — HOSPITAL ENCOUNTER (OUTPATIENT)
Dept: RADIOLOGY | Facility: CLINIC | Age: 80
Discharge: HOME | End: 2025-08-08
Payer: MEDICARE

## 2025-08-08 DIAGNOSIS — Z78.0 MENOPAUSE: ICD-10-CM

## 2025-08-08 DIAGNOSIS — K21.9 GASTROESOPHAGEAL REFLUX DISEASE WITHOUT ESOPHAGITIS: ICD-10-CM

## 2025-08-08 PROCEDURE — 77080 DXA BONE DENSITY AXIAL: CPT

## 2025-08-09 RX ORDER — PANTOPRAZOLE SODIUM 40 MG/1
40 TABLET, DELAYED RELEASE ORAL
Qty: 90 TABLET | Refills: 1 | Status: SHIPPED | OUTPATIENT
Start: 2025-08-09

## 2026-01-27 ENCOUNTER — APPOINTMENT (OUTPATIENT)
Dept: PRIMARY CARE | Facility: CLINIC | Age: 81
End: 2026-01-27
Payer: MEDICARE

## (undated) DEVICE — SYRINGE, 20 CC, LUER LOCK

## (undated) DEVICE — Device

## (undated) DEVICE — BLADE, ARTHRO-LOK, MINI-MENISCUS, FLAT, 4 MM

## (undated) DEVICE — TISSUE ADHESIVE, EXOFIN, 1ML

## (undated) DEVICE — PADDING, WEBRIL, UNDERCAST, STERILE, 3 IN

## (undated) DEVICE — APPLICATOR, CHLORAPREP, W/ORANGE TINT, 26ML

## (undated) DEVICE — BANDAGE, ELASTIC, COMPRESSION, W/REMOVABLE CLIP, 3 IN X 4.5 YD

## (undated) DEVICE — DRESSING, ABDOMINAL, WET PRUF, TENDERSORB, 5 X 9 IN, STERILE

## (undated) DEVICE — DRESSING, GAUZE, SPONGE, 12 PLY, 4 X 4 IN, PLASTIC POUCH, STRL 10PK

## (undated) DEVICE — CUFF, TOURNIQUET, 18 X 3, DUAL PORT/SNGL BLADDER, DISP, LF

## (undated) DEVICE — GLOVE, SURGICAL, PROTEXIS PI , 7.5, PF, LF

## (undated) DEVICE — GLOVE, SURGICAL, PROTEXIS PI BLUE W/NEUTHERA, 8.0, PF, LF

## (undated) DEVICE — SUTURE, MONOCRYLIC, 4-0, P3, MONO 18